# Patient Record
Sex: MALE | HISPANIC OR LATINO | Employment: FULL TIME | URBAN - METROPOLITAN AREA
[De-identification: names, ages, dates, MRNs, and addresses within clinical notes are randomized per-mention and may not be internally consistent; named-entity substitution may affect disease eponyms.]

---

## 2020-10-16 ENCOUNTER — OFFICE VISIT (OUTPATIENT)
Dept: MEDICAL GROUP | Facility: MEDICAL CENTER | Age: 55
End: 2020-10-16
Payer: COMMERCIAL

## 2020-10-16 VITALS
OXYGEN SATURATION: 95 % | WEIGHT: 163.14 LBS | BODY MASS INDEX: 27.18 KG/M2 | HEIGHT: 65 IN | SYSTOLIC BLOOD PRESSURE: 127 MMHG | RESPIRATION RATE: 16 BRPM | TEMPERATURE: 97.2 F | HEART RATE: 66 BPM | DIASTOLIC BLOOD PRESSURE: 70 MMHG

## 2020-10-16 DIAGNOSIS — H93.13 TINNITUS OF BOTH EARS: ICD-10-CM

## 2020-10-16 DIAGNOSIS — R42 VERTIGO: ICD-10-CM

## 2020-10-16 DIAGNOSIS — Z12.12 SCREENING FOR COLORECTAL CANCER: ICD-10-CM

## 2020-10-16 DIAGNOSIS — E78.6 LOW HDL (UNDER 40): ICD-10-CM

## 2020-10-16 DIAGNOSIS — Z12.11 SCREENING FOR COLORECTAL CANCER: ICD-10-CM

## 2020-10-16 DIAGNOSIS — Z12.5 SCREENING FOR PROSTATE CANCER: ICD-10-CM

## 2020-10-16 DIAGNOSIS — R73.03 PREDIABETES: ICD-10-CM

## 2020-10-16 DIAGNOSIS — Z11.59 NEED FOR HEPATITIS C SCREENING TEST: ICD-10-CM

## 2020-10-16 PROCEDURE — 99204 OFFICE O/P NEW MOD 45 MIN: CPT | Performed by: FAMILY MEDICINE

## 2020-10-16 RX ORDER — MECLIZINE HYDROCHLORIDE 25 MG/1
25 TABLET ORAL EVERY 8 HOURS PRN
Qty: 60 TAB | Refills: 0 | Status: SHIPPED | OUTPATIENT
Start: 2020-10-16 | End: 2022-06-27

## 2020-10-16 ASSESSMENT — PATIENT HEALTH QUESTIONNAIRE - PHQ9: CLINICAL INTERPRETATION OF PHQ2 SCORE: 0

## 2020-10-16 ASSESSMENT — ENCOUNTER SYMPTOMS
EYE DISCHARGE: 0
MYALGIAS: 0
EYE REDNESS: 0
PALPITATIONS: 0
SHORTNESS OF BREATH: 0
WEIGHT LOSS: 0
CHILLS: 0
HEMOPTYSIS: 0
ABDOMINAL PAIN: 0
SENSORY CHANGE: 0
NAUSEA: 0
NERVOUS/ANXIOUS: 0
COUGH: 0
HEADACHES: 0
SINUS PAIN: 0
WHEEZING: 0
EYE PAIN: 0
FEVER: 0
VOMITING: 0
DIARRHEA: 0
FOCAL WEAKNESS: 0
CONSTIPATION: 0
SPUTUM PRODUCTION: 0
DEPRESSION: 0

## 2020-10-16 ASSESSMENT — LIFESTYLE VARIABLES: SUBSTANCE_ABUSE: 0

## 2020-10-16 NOTE — PROGRESS NOTES
FAMILY MEDICINE VISIT                                                               Chief complaint::Diagnoses of Tinnitus of both ears, Vertigo, Low HDL (under 40), Screening for prostate cancer, Need for hepatitis C screening test, Screening for colorectal cancer, and Prediabetes were pertinent to this visit.    History of present illness: Alek Elliott is a 54 y.o. male who presented to establish care, vertigo, tinnitus of ear, labs.    Patient reports that he has a history of ringing sensation in both ears with vertigo symptoms.  He reports that he previously followed with ENT and reports that everything came back normal..  He reports that occasionally he gets tingling sensation in ER with dizziness with change in position of head.  He reports that these episodes are intermittent in nature.  Reports that sometimes they are associated with headache at back of head.  He does wear glasses, reports that he has not been checked for vision recently.  He reports that he also does not drink much water.  He denies any other neurological symptoms.  He has not tried any medications before.    He reports that he has history of prediabetes in the past.  He is currently not on any medication for prediabetes.  He has history of low HDL in the past.  He reports that he wants to get checked for prostate also.  He reports that he feels sometimes tired.    Review of systems:     Review of Systems   Constitutional: Positive for malaise/fatigue. Negative for chills, fever and weight loss.   HENT: Negative for ear discharge, ear pain, hearing loss and sinus pain.    Eyes: Negative for pain, discharge and redness.   Respiratory: Negative for cough, hemoptysis, sputum production, shortness of breath and wheezing.    Cardiovascular: Negative for chest pain, palpitations and leg swelling.   Gastrointestinal: Negative for abdominal pain, constipation, diarrhea, nausea and vomiting.   Genitourinary: Negative for dysuria, frequency and  "urgency.   Musculoskeletal: Negative for joint pain and myalgias.   Skin: Negative for itching and rash.   Neurological: Negative for sensory change, focal weakness and headaches.        Episodes of vertigo with ringing sensation in ear sometimes associated with headaches.   Endo/Heme/Allergies: Negative for environmental allergies.   Psychiatric/Behavioral: Negative for depression, substance abuse and suicidal ideas. The patient is not nervous/anxious.         Past Medical, Surgical and Family History:    Past Medical History:   Diagnosis Date   • Abnormal glucose tolerance test    • History of TIA (transient ischemic attack) 2013   • Low HDL (under 40)      History reviewed. No pertinent surgical history.  Family History   Problem Relation Age of Onset   • Cancer Father         lung?   • No Known Problems Mother         Social History:    Social History     Tobacco Use   • Smoking status: Former Smoker     Packs/day: 0.50     Years: 28.00     Pack years: 14.00     Types: Cigarettes     Quit date: 2014     Years since quittin.4   • Smokeless tobacco: Never Used   Substance Use Topics   • Alcohol use: No   • Drug use: No        Medications and Allergies:     Current Outpatient Medications   Medication Sig Dispense Refill   • meclizine (ANTIVERT) 25 MG Tab Take 1 Tab by mouth every 8 hours as needed for Dizziness. 60 Tab 0   • aspirin (ASA) 81 MG CHEW Take 1 Tab by mouth every day. 100 Each 11     No current facility-administered medications for this visit.         No Known Allergies    Vitals:    /70 (BP Location: Left arm, Patient Position: Sitting, BP Cuff Size: Adult)   Pulse 66   Temp 36.2 °C (97.2 °F)   Resp 16   Ht 1.651 m (5' 5\")   Wt 74 kg (163 lb 2.3 oz)   SpO2 95%  Body mass index is 27.15 kg/m².    Physical Exam:     Physical Exam   Constitutional: He is well-developed, well-nourished, and in no distress. No distress.   HENT:   Head: Normocephalic and atraumatic.   Right Ear: " External ear normal.   Left Ear: External ear normal.   Eyes: Conjunctivae and EOM are normal. Right eye exhibits no discharge. Left eye exhibits no discharge.   Neck: Neck supple. No thyromegaly present.   Cardiovascular: Normal rate, regular rhythm, normal heart sounds and intact distal pulses.   No murmur heard.  Pulmonary/Chest: Effort normal and breath sounds normal. No respiratory distress. He has no wheezes. He has no rales.   Abdominal: Soft. Bowel sounds are normal. He exhibits no distension. There is no abdominal tenderness. There is no guarding.   Musculoskeletal:         General: No deformity or edema.   Neurological: He is alert. Gait normal.   Skin: Skin is warm. No rash noted. He is not diaphoretic.   Psychiatric: Mood, affect and judgment normal.        Assessment/Plan:    Diagnoses and all orders for this visit:    Tinnitus of both ears  Vertigo  · Previous evaluation by ENT as per patient negative.  Check below labs.  · Prescribed meclizine as needed for vertigo.  · If symptoms not better, will refer to ENT.  · Advised patient to drink at least 64 ounces of water in a day.  · Advised to get his vision checked.    -     CBC WITHOUT DIFFERENTIAL; Future  -     Comp Metabolic Panel; Future  -     Lipid Profile; Future  -     TSH WITH REFLEX TO FT4; Future  -     VITAMIN D,25 HYDROXY; Future  -     VITAMIN B12; Future  -     HEMOGLOBIN A1C; Future  -     meclizine (ANTIVERT) 25 MG Tab; Take 1 Tab by mouth every 8 hours as needed for Dizziness.    Low HDL (under 40)  · Recheck lipid panel.  · Advised healthy diet and aerobic exercise.    -     Comp Metabolic Panel; Future  -     Lipid Profile; Future    Screening for prostate cancer  · Check PSA for prostate cancer.    -     PROSTATE SPECIFIC AG SCREENING; Future    · Need for hepatitis C screening test  · Check hepatitis C virus antibody for hepatitis C screening.    -     HEP C VIRUS ANTIBODY; Future    Screening for colorectal cancer  · Referral to  GI for colonoscopy.    -     REFERRAL TO GI FOR COLONOSCOPY    Prediabetes  · Advised healthy diet and aerobic exercise.  · Check A1c.    -     HEMOGLOBIN A1C; Future    Patient will check with his insurance regarding tetanus and shingles vaccine.  [facility]    Please note that this dictation was created using voice recognition software. I have made every reasonable attempt to correct obvious errors, but I expect that there are errors of grammar and possibly content that I did not discover before finalizing the note.    Follow up in 2 months for follow-up.

## 2020-10-19 ENCOUNTER — HOSPITAL ENCOUNTER (OUTPATIENT)
Dept: LAB | Facility: MEDICAL CENTER | Age: 55
End: 2020-10-19
Attending: FAMILY MEDICINE
Payer: COMMERCIAL

## 2020-10-19 DIAGNOSIS — Z12.5 SCREENING FOR PROSTATE CANCER: ICD-10-CM

## 2020-10-19 DIAGNOSIS — Z11.59 NEED FOR HEPATITIS C SCREENING TEST: ICD-10-CM

## 2020-10-19 DIAGNOSIS — R42 VERTIGO: ICD-10-CM

## 2020-10-19 DIAGNOSIS — E78.6 LOW HDL (UNDER 40): ICD-10-CM

## 2020-10-19 DIAGNOSIS — R73.03 PREDIABETES: ICD-10-CM

## 2020-10-19 LAB
25(OH)D3 SERPL-MCNC: 28 NG/ML (ref 30–100)
ALBUMIN SERPL BCP-MCNC: 4.3 G/DL (ref 3.2–4.9)
ALBUMIN/GLOB SERPL: 1.3 G/DL
ALP SERPL-CCNC: 84 U/L (ref 30–99)
ALT SERPL-CCNC: 21 U/L (ref 2–50)
ANION GAP SERPL CALC-SCNC: 10 MMOL/L (ref 7–16)
AST SERPL-CCNC: 20 U/L (ref 12–45)
BILIRUB SERPL-MCNC: 0.3 MG/DL (ref 0.1–1.5)
BUN SERPL-MCNC: 18 MG/DL (ref 8–22)
CALCIUM SERPL-MCNC: 9.3 MG/DL (ref 8.5–10.5)
CHLORIDE SERPL-SCNC: 103 MMOL/L (ref 96–112)
CHOLEST SERPL-MCNC: 210 MG/DL (ref 100–199)
CO2 SERPL-SCNC: 26 MMOL/L (ref 20–33)
CREAT SERPL-MCNC: 0.93 MG/DL (ref 0.5–1.4)
ERYTHROCYTE [DISTWIDTH] IN BLOOD BY AUTOMATED COUNT: 46.7 FL (ref 35.9–50)
EST. AVERAGE GLUCOSE BLD GHB EST-MCNC: 114 MG/DL
FASTING STATUS PATIENT QL REPORTED: NORMAL
GLOBULIN SER CALC-MCNC: 3.2 G/DL (ref 1.9–3.5)
GLUCOSE SERPL-MCNC: 113 MG/DL (ref 65–99)
HBA1C MFR BLD: 5.6 % (ref 0–5.6)
HCT VFR BLD AUTO: 49.9 % (ref 42–52)
HCV AB SER QL: NORMAL
HDLC SERPL-MCNC: 33 MG/DL
HGB BLD-MCNC: 16 G/DL (ref 14–18)
LDLC SERPL CALC-MCNC: 135 MG/DL
MCH RBC QN AUTO: 31.4 PG (ref 27–33)
MCHC RBC AUTO-ENTMCNC: 32.1 G/DL (ref 33.7–35.3)
MCV RBC AUTO: 97.8 FL (ref 81.4–97.8)
PLATELET # BLD AUTO: 257 K/UL (ref 164–446)
PMV BLD AUTO: 10.8 FL (ref 9–12.9)
POTASSIUM SERPL-SCNC: 4.3 MMOL/L (ref 3.6–5.5)
PROT SERPL-MCNC: 7.5 G/DL (ref 6–8.2)
PSA SERPL-MCNC: 2.26 NG/ML (ref 0–4)
RBC # BLD AUTO: 5.1 M/UL (ref 4.7–6.1)
SODIUM SERPL-SCNC: 139 MMOL/L (ref 135–145)
TRIGL SERPL-MCNC: 210 MG/DL (ref 0–149)
TSH SERPL DL<=0.005 MIU/L-ACNC: 1.77 UIU/ML (ref 0.38–5.33)
VIT B12 SERPL-MCNC: 819 PG/ML (ref 211–911)
WBC # BLD AUTO: 6.9 K/UL (ref 4.8–10.8)

## 2020-10-19 PROCEDURE — 85027 COMPLETE CBC AUTOMATED: CPT

## 2020-10-19 PROCEDURE — 86803 HEPATITIS C AB TEST: CPT

## 2020-10-19 PROCEDURE — 80053 COMPREHEN METABOLIC PANEL: CPT

## 2020-10-19 PROCEDURE — 82607 VITAMIN B-12: CPT

## 2020-10-19 PROCEDURE — 36415 COLL VENOUS BLD VENIPUNCTURE: CPT

## 2020-10-19 PROCEDURE — 82306 VITAMIN D 25 HYDROXY: CPT

## 2020-10-19 PROCEDURE — 84443 ASSAY THYROID STIM HORMONE: CPT

## 2020-10-19 PROCEDURE — 83036 HEMOGLOBIN GLYCOSYLATED A1C: CPT

## 2020-10-19 PROCEDURE — 80061 LIPID PANEL: CPT

## 2020-10-19 PROCEDURE — 84153 ASSAY OF PSA TOTAL: CPT

## 2020-10-20 DIAGNOSIS — E55.9 VITAMIN D DEFICIENCY: ICD-10-CM

## 2020-10-20 RX ORDER — ERGOCALCIFEROL 1.25 MG/1
50000 CAPSULE ORAL
Qty: 12 CAP | Refills: 1 | Status: SHIPPED | OUTPATIENT
Start: 2020-10-20 | End: 2020-12-17 | Stop reason: SDUPTHER

## 2020-10-20 NOTE — RESULT ENCOUNTER NOTE
Patient's my chart pending.  Please call patient to let him know that I reviewed his results.  His A1c, blood counts, prostate test, electrolytes, kidney function test, liver function test, thyroid function test, vitamin B12 came back normal.His vitamin D is low, I prescribed him vitamin D 50,000 international units once a week.His cholesterol levels are elevated.  I will discuss about starting medication at next visit on 12/17/2020.  Please advise him to eat healthy diet and aerobic exercise 150 minutes in a week.Thank you.

## 2020-12-17 ENCOUNTER — OFFICE VISIT (OUTPATIENT)
Dept: MEDICAL GROUP | Facility: MEDICAL CENTER | Age: 55
End: 2020-12-17
Payer: COMMERCIAL

## 2020-12-17 VITALS
DIASTOLIC BLOOD PRESSURE: 71 MMHG | WEIGHT: 167.55 LBS | TEMPERATURE: 97.6 F | RESPIRATION RATE: 18 BRPM | OXYGEN SATURATION: 97 % | HEIGHT: 65 IN | BODY MASS INDEX: 27.92 KG/M2 | SYSTOLIC BLOOD PRESSURE: 135 MMHG | HEART RATE: 67 BPM

## 2020-12-17 DIAGNOSIS — E55.9 VITAMIN D DEFICIENCY: ICD-10-CM

## 2020-12-17 DIAGNOSIS — E78.2 MIXED HYPERLIPIDEMIA: ICD-10-CM

## 2020-12-17 DIAGNOSIS — R73.03 PREDIABETES: ICD-10-CM

## 2020-12-17 PROCEDURE — 99214 OFFICE O/P EST MOD 30 MIN: CPT | Performed by: FAMILY MEDICINE

## 2020-12-17 RX ORDER — ERGOCALCIFEROL 1.25 MG/1
50000 CAPSULE ORAL
Qty: 12 CAP | Refills: 1 | Status: SHIPPED | OUTPATIENT
Start: 2020-12-17 | End: 2021-12-13 | Stop reason: SDUPTHER

## 2020-12-17 RX ORDER — ATORVASTATIN CALCIUM 20 MG/1
20 TABLET, FILM COATED ORAL DAILY
Qty: 90 TAB | Refills: 1 | Status: SHIPPED | OUTPATIENT
Start: 2020-12-17 | End: 2021-12-27 | Stop reason: SDUPTHER

## 2020-12-17 ASSESSMENT — ENCOUNTER SYMPTOMS
NAUSEA: 0
DIARRHEA: 0
PALPITATIONS: 0
FEVER: 0
MYALGIAS: 0
DIZZINESS: 0
HEADACHES: 0
DEPRESSION: 0
SENSORY CHANGE: 0
WHEEZING: 0
CHILLS: 0
SHORTNESS OF BREATH: 0
BLOOD IN STOOL: 0
ABDOMINAL PAIN: 0
NERVOUS/ANXIOUS: 0
VOMITING: 0
FOCAL WEAKNESS: 0
HEMOPTYSIS: 0
COUGH: 0
CONSTIPATION: 0

## 2020-12-17 ASSESSMENT — FIBROSIS 4 INDEX: FIB4 SCORE: 0.93

## 2020-12-18 NOTE — ASSESSMENT & PLAN NOTE
New problem for this patient.  Recent blood work showed vitamin D 28.  I prescribed him vitamin D 50,000 international units to [facility] pharmacy but patient reports that he was looking at [facility] pharmacy.

## 2020-12-18 NOTE — ASSESSMENT & PLAN NOTE
This is a chronic problem for this patient.  His recent A1c came back at 5.6 which improved from 5.7.

## 2020-12-18 NOTE — PROGRESS NOTES
FAMILY MEDICINE VISIT                                                               Chief complaint::Diagnoses of Vitamin D deficiency, Prediabetes, and Mixed hyperlipidemia were pertinent to this visit.    History of present illness: Alek Elliott is a 55 y.o. male who presented for follow-up for labs.    Mixed hyperlipidemia  This is a chronic plan for this patient.  Recent lipid panel showed elevated total cholesterol, triglyceride and LDL level.  He does have low HDL level.  He is currently not on any statins.      Lab Results   Component Value Date/Time    CHOLSTRLTOT 210 (H) 10/19/2020 0719    TRIGLYCERIDE 210 (H) 10/19/2020 0719    HDL 33 (A) 10/19/2020 0719     (H) 10/19/2020 0719       Prediabetes  This is a chronic problem for this patient.  His recent A1c came back at 5.6 which improved from 5.7.    Vitamin D deficiency  New problem for this patient.  Recent blood work showed vitamin D 28.  I prescribed him vitamin D 50,000 international units to [facility] pharmacy but patient reports that he was looking at [facility] pharmacy.            Review of systems:     Review of Systems   Constitutional: Negative for chills, fever and malaise/fatigue.   Respiratory: Negative for cough, hemoptysis, shortness of breath and wheezing.    Cardiovascular: Negative for chest pain, palpitations and leg swelling.   Gastrointestinal: Negative for abdominal pain, blood in stool, constipation, diarrhea, nausea and vomiting.   Musculoskeletal: Negative for myalgias.   Neurological: Negative for dizziness, sensory change, focal weakness and headaches.   Psychiatric/Behavioral: Negative for depression and suicidal ideas. The patient is not nervous/anxious.         Past Medical, Surgical and Family History:    Past Medical History:   Diagnosis Date   • Abnormal glucose tolerance test    • History of TIA (transient ischemic attack) 2/2013   • Low HDL (under 40)      No past surgical history on file.  Family History  "  Problem Relation Age of Onset   • Cancer Father         lung?   • No Known Problems Mother         Social History:    Social History     Tobacco Use   • Smoking status: Former Smoker     Packs/day: 0.50     Years: 28.00     Pack years: 14.00     Types: Cigarettes     Quit date: 2014     Years since quittin.6   • Smokeless tobacco: Never Used   Substance Use Topics   • Alcohol use: No   • Drug use: No        Medications and Allergies:     Current Outpatient Medications   Medication Sig Dispense Refill   • vitamin D, Ergocalciferol, (DRISDOL) 1.25 MG (95602 UT) Cap capsule Take 1 Cap by mouth every 7 days. 12 Cap 1   • atorvastatin (LIPITOR) 20 MG Tab Take 1 Tab by mouth every day. 90 Tab 1   • meclizine (ANTIVERT) 25 MG Tab Take 1 Tab by mouth every 8 hours as needed for Dizziness. 60 Tab 0   • aspirin (ASA) 81 MG CHEW Take 1 Tab by mouth every day. 100 Each 11     No current facility-administered medications for this visit.         No Known Allergies    Vitals:    /71 (BP Location: Left arm, Patient Position: Sitting, BP Cuff Size: Adult)   Pulse 67   Temp 36.4 °C (97.6 °F)   Resp 18   Ht 1.651 m (5' 5\")   Wt 76 kg (167 lb 8.8 oz)   SpO2 97%  Body mass index is 27.88 kg/m².    Physical Exam:     Physical Exam   Constitutional: He is well-developed, well-nourished, and in no distress. No distress.   HENT:   Head: Normocephalic and atraumatic.   Eyes: Conjunctivae are normal.   Neck: Neck supple.   Cardiovascular: Normal rate, regular rhythm and normal heart sounds.   Pulmonary/Chest: Effort normal and breath sounds normal. No respiratory distress. He has no wheezes. He has no rales.   Musculoskeletal:         General: No deformity or edema.   Neurological: He is alert. Gait normal.   Skin: No rash noted.   Psychiatric: Mood, affect and judgment normal.        Labs:    Reviewed labs with patient.    Assessment/Plan:    Diagnoses and all orders for this visit:    Vitamin D deficiency  · Prescribed " vitamin D 50,000 international units every 7 days.  · Recheck labs in 4 months.    -     vitamin D, Ergocalciferol, (DRISDOL) 1.25 MG (78941 UT) Cap capsule; Take 1 Cap by mouth every 7 days.  -     VITAMIN D,25 HYDROXY; Future    Prediabetes  · Improving, continue healthy diet and aerobic exercise.    -     Comp Metabolic Panel; Future    Mixed hyperlipidemia  · Uncontrolled.  · The 10-year ASCVD risk score (Bairon KOBE Jr., et al., 2013) is: 9.3%  · Start on atorvastatin 20 mg once daily.  · Discussed with patient about side effects of this medication.  · Recheck lipid panel in 4 months.      -     atorvastatin (LIPITOR) 20 MG Tab; Take 1 Tab by mouth every day.  -     Lipid Profile; Future         Please note that this dictation was created using voice recognition software. I have made every reasonable attempt to correct obvious errors, but I expect that there are errors of grammar and possibly content that I did not discover before finalizing the note.    Follow up in 4 months for follow-up for labs.

## 2020-12-18 NOTE — ASSESSMENT & PLAN NOTE
This is a chronic plan for this patient.  Recent lipid panel showed elevated total cholesterol, triglyceride and LDL level.  He does have low HDL level.  He is currently not on any statins.      Lab Results   Component Value Date/Time    CHOLSTRLTOT 210 (H) 10/19/2020 0719    TRIGLYCERIDE 210 (H) 10/19/2020 0719    HDL 33 (A) 10/19/2020 0719     (H) 10/19/2020 0719

## 2021-03-15 DIAGNOSIS — Z23 NEED FOR VACCINATION: ICD-10-CM

## 2021-04-16 ENCOUNTER — APPOINTMENT (OUTPATIENT)
Dept: MEDICAL GROUP | Facility: MEDICAL CENTER | Age: 56
End: 2021-04-16
Payer: COMMERCIAL

## 2021-11-24 ENCOUNTER — TELEPHONE (OUTPATIENT)
Dept: MEDICAL GROUP | Facility: MEDICAL CENTER | Age: 56
End: 2021-11-24

## 2021-11-24 DIAGNOSIS — Z12.11 COLON CANCER SCREENING: ICD-10-CM

## 2021-12-10 ENCOUNTER — HOSPITAL ENCOUNTER (OUTPATIENT)
Dept: LAB | Facility: MEDICAL CENTER | Age: 56
End: 2021-12-10
Attending: FAMILY MEDICINE
Payer: COMMERCIAL

## 2021-12-10 DIAGNOSIS — E55.9 VITAMIN D DEFICIENCY: ICD-10-CM

## 2021-12-10 DIAGNOSIS — E78.2 MIXED HYPERLIPIDEMIA: ICD-10-CM

## 2021-12-10 DIAGNOSIS — R73.03 PREDIABETES: ICD-10-CM

## 2021-12-10 LAB
ALBUMIN SERPL BCP-MCNC: 4 G/DL (ref 3.2–4.9)
ALBUMIN/GLOB SERPL: 1.5 G/DL
ALP SERPL-CCNC: 84 U/L (ref 30–99)
ALT SERPL-CCNC: 19 U/L (ref 2–50)
ANION GAP SERPL CALC-SCNC: 10 MMOL/L (ref 7–16)
AST SERPL-CCNC: 26 U/L (ref 12–45)
BILIRUB SERPL-MCNC: 0.3 MG/DL (ref 0.1–1.5)
BUN SERPL-MCNC: 15 MG/DL (ref 8–22)
CALCIUM SERPL-MCNC: 8.7 MG/DL (ref 8.5–10.5)
CHLORIDE SERPL-SCNC: 104 MMOL/L (ref 96–112)
CHOLEST SERPL-MCNC: 183 MG/DL (ref 100–199)
CO2 SERPL-SCNC: 25 MMOL/L (ref 20–33)
CREAT SERPL-MCNC: 0.91 MG/DL (ref 0.5–1.4)
FASTING STATUS PATIENT QL REPORTED: NORMAL
GLOBULIN SER CALC-MCNC: 2.6 G/DL (ref 1.9–3.5)
GLUCOSE SERPL-MCNC: 110 MG/DL (ref 65–99)
HDLC SERPL-MCNC: 31 MG/DL
LDLC SERPL CALC-MCNC: 114 MG/DL
POTASSIUM SERPL-SCNC: 4.3 MMOL/L (ref 3.6–5.5)
PROT SERPL-MCNC: 6.6 G/DL (ref 6–8.2)
SODIUM SERPL-SCNC: 139 MMOL/L (ref 135–145)
TRIGL SERPL-MCNC: 188 MG/DL (ref 0–149)

## 2021-12-10 PROCEDURE — 80053 COMPREHEN METABOLIC PANEL: CPT

## 2021-12-10 PROCEDURE — 80061 LIPID PANEL: CPT

## 2021-12-10 PROCEDURE — 82306 VITAMIN D 25 HYDROXY: CPT

## 2021-12-10 PROCEDURE — 36415 COLL VENOUS BLD VENIPUNCTURE: CPT

## 2021-12-13 ENCOUNTER — TELEPHONE (OUTPATIENT)
Dept: MEDICAL GROUP | Facility: MEDICAL CENTER | Age: 56
End: 2021-12-13

## 2021-12-13 DIAGNOSIS — E55.9 VITAMIN D DEFICIENCY: ICD-10-CM

## 2021-12-13 LAB — 25(OH)D3 SERPL-MCNC: 19 NG/ML (ref 30–80)

## 2021-12-13 RX ORDER — ERGOCALCIFEROL 1.25 MG/1
50000 CAPSULE ORAL
Qty: 12 CAPSULE | Refills: 2 | Status: SHIPPED | OUTPATIENT
Start: 2021-12-13 | End: 2022-06-27 | Stop reason: SDUPTHER

## 2021-12-13 NOTE — TELEPHONE ENCOUNTER
----- Message from Kuldeep Cadet M.D. sent at 12/13/2021  8:17 AM PST -----  MyChart is pending and he does not have any follow-up appointment.  Please call patient regarding his labs.  His cholesterol panel numbers are improving, continue same medication regimen atorvastatin 20 mg.  His blood sugar level came back elevated at 110.  His last appointment was on 12/17/2020, please recommend patient to schedule appointment for follow-up regarding these results. Thank you.

## 2021-12-14 NOTE — RESULT ENCOUNTER NOTE
Patient's vitamin D level came back very low, his MyChart is pending.  Please call patient regarding vitamin D results.  I prescribed vitamin D 50,000/units weekly.

## 2021-12-15 ENCOUNTER — TELEPHONE (OUTPATIENT)
Dept: MEDICAL GROUP | Facility: MEDICAL CENTER | Age: 56
End: 2021-12-15

## 2021-12-15 NOTE — TELEPHONE ENCOUNTER
Called and spoke to pt regarding results per PCP request. Pt verbalized understanding, no further questions at this time. Pt was scheduled for appt with Dr Cadet on 12/27/2021 at 2pm

## 2021-12-27 ENCOUNTER — OFFICE VISIT (OUTPATIENT)
Dept: MEDICAL GROUP | Facility: MEDICAL CENTER | Age: 56
End: 2021-12-27
Payer: COMMERCIAL

## 2021-12-27 VITALS
RESPIRATION RATE: 14 BRPM | SYSTOLIC BLOOD PRESSURE: 110 MMHG | DIASTOLIC BLOOD PRESSURE: 74 MMHG | HEART RATE: 79 BPM | HEIGHT: 65 IN | WEIGHT: 182.98 LBS | TEMPERATURE: 98.1 F | OXYGEN SATURATION: 96 % | BODY MASS INDEX: 30.49 KG/M2

## 2021-12-27 DIAGNOSIS — R73.03 PREDIABETES: ICD-10-CM

## 2021-12-27 DIAGNOSIS — Z23 NEED FOR VACCINATION: ICD-10-CM

## 2021-12-27 DIAGNOSIS — E78.2 MIXED HYPERLIPIDEMIA: ICD-10-CM

## 2021-12-27 DIAGNOSIS — E55.9 VITAMIN D DEFICIENCY: ICD-10-CM

## 2021-12-27 DIAGNOSIS — E66.09 CLASS 1 OBESITY DUE TO EXCESS CALORIES WITHOUT SERIOUS COMORBIDITY WITH BODY MASS INDEX (BMI) OF 30.0 TO 30.9 IN ADULT: ICD-10-CM

## 2021-12-27 PROCEDURE — 99214 OFFICE O/P EST MOD 30 MIN: CPT | Mod: 25 | Performed by: FAMILY MEDICINE

## 2021-12-27 PROCEDURE — 90471 IMMUNIZATION ADMIN: CPT | Performed by: FAMILY MEDICINE

## 2021-12-27 PROCEDURE — 90715 TDAP VACCINE 7 YRS/> IM: CPT | Performed by: FAMILY MEDICINE

## 2021-12-27 RX ORDER — ATORVASTATIN CALCIUM 20 MG/1
20 TABLET, FILM COATED ORAL DAILY
Qty: 90 TABLET | Refills: 1 | Status: SHIPPED | OUTPATIENT
Start: 2021-12-27 | End: 2022-06-27 | Stop reason: SDUPTHER

## 2021-12-27 ASSESSMENT — ENCOUNTER SYMPTOMS
COUGH: 0
WHEEZING: 0
FEVER: 0
SHORTNESS OF BREATH: 0
PALPITATIONS: 0
CHILLS: 0

## 2021-12-27 ASSESSMENT — PATIENT HEALTH QUESTIONNAIRE - PHQ9
5. POOR APPETITE OR OVEREATING: 0 - NOT AT ALL
SUM OF ALL RESPONSES TO PHQ QUESTIONS 1-9: 4
CLINICAL INTERPRETATION OF PHQ2 SCORE: 2

## 2021-12-27 ASSESSMENT — FIBROSIS 4 INDEX: FIB4 SCORE: 1.3

## 2021-12-27 NOTE — PROGRESS NOTES
FAMILY MEDICINE VISIT                                                               Chief complaint::Diagnoses of Mixed hyperlipidemia, Prediabetes, Vitamin D deficiency, Need for vaccination, and Class 1 obesity due to excess calories without serious comorbidity with body mass index (BMI) of 30.0 to 30.9 in adult were pertinent to this visit.    History of present illness: Alek Elliott is a 56 y.o. male who presented for lab follow-up.    Problem   Vitamin D Deficiency    Recent vitamin D level came back at 19, previously prescribed and vitamin high dose, reports that he is currently not taking vitamin D 50,000/units daily as he ran out of refills.  He has been taking over-the-counter supplementation.  He reports that he has been feeling fatigued also.     Prediabetes    Recent blood sugar came back elevated at 110, previous A1c came back at 5.6 which improved from previous numbers.  He reports that he is trying to eat healthy diet.     Mixed Hyperlipidemia    Recent cholesterol numbers showed improvement in total cholesterol, triglyceride and LDL level.  Levels are still elevated.  He is not taking statins which I prescribed at last visit.    Lab Results   Component Value Date/Time    CHOLSTRLTOT 183 12/10/2021 0639    TRIGLYCERIDE 188 (H) 12/10/2021 0639    HDL 31 (A) 12/10/2021 0639     (H) 12/10/2021 0639          Review of systems:     Review of Systems   Constitutional: Positive for malaise/fatigue. Negative for chills and fever.   Respiratory: Negative for cough, shortness of breath and wheezing.    Cardiovascular: Negative for chest pain, palpitations and leg swelling.        Medications and Allergies:     Current Outpatient Medications   Medication Sig Dispense Refill   • atorvastatin (LIPITOR) 20 MG Tab Take 1 Tablet by mouth every day. 90 Tablet 1   • vitamin D2, Ergocalciferol, (DRISDOL) 1.25 MG (51165 UT) Cap capsule Take 1 Capsule by mouth every 7 days. 12 Capsule 2   • meclizine  "(ANTIVERT) 25 MG Tab Take 1 Tab by mouth every 8 hours as needed for Dizziness. 60 Tab 0   • aspirin (ASA) 81 MG CHEW Take 1 Tab by mouth every day. 100 Each 11     No current facility-administered medications for this visit.          Vitals:    /74 (BP Location: Left arm, Patient Position: Sitting, BP Cuff Size: Adult)   Pulse 79   Temp 36.7 °C (98.1 °F) (Temporal)   Resp 14   Ht 1.651 m (5' 5\")   Wt 83 kg (182 lb 15.7 oz)   SpO2 96%  Body mass index is 30.45 kg/m².    Physical Exam:     Physical Exam  Constitutional:       General: He is not in acute distress.  HENT:      Head: Normocephalic and atraumatic.   Eyes:      Conjunctiva/sclera: Conjunctivae normal.   Cardiovascular:      Rate and Rhythm: Normal rate and regular rhythm.      Heart sounds: Normal heart sounds. No murmur heard.  No friction rub. No gallop.    Pulmonary:      Effort: Pulmonary effort is normal. No respiratory distress.      Breath sounds: Normal breath sounds. No wheezing or rales.   Musculoskeletal:         General: No deformity.      Cervical back: Neck supple.   Neurological:      Mental Status: He is alert.      Gait: Gait is intact.   Psychiatric:         Mood and Affect: Mood and affect normal.         Judgment: Judgment normal.          Labs:  I reviewed with patient recent labs resulted on 12/10/2021.    Assessment/Plan:    Problem List Items Addressed This Visit     Mixed hyperlipidemia     Chronic health problem, are improving but still elevated.  Recommended to take atorvastatin 20 mg daily.  Recheck labs in 6 months.  Eat healthy diet and do aerobic exercise 150 minutes in a week.         Relevant Medications    atorvastatin (LIPITOR) 20 MG Tab    Other Relevant Orders    Comp Metabolic Panel    Lipid Profile    Prediabetes     Chronic health problem, recheck A1c to assess control.  Continue to eat healthy diet and do aerobic exercise.         Relevant Orders    HEMOGLOBIN A1C    Vitamin D deficiency     Chronic " health problem, low vitamin D level, prescribed vitamin D 50,000/units daily, recheck labs in 6 months.         Relevant Orders    VITAMIN D,25 HYDROXY      Other Visit Diagnoses     Need for vaccination        Relevant Orders    Tdap =>6yo IM    Class 1 obesity due to excess calories without serious comorbidity with body mass index (BMI) of 30.0 to 30.9 in adult        Relevant Orders    Patient identified as having weight management issue.  Appropriate orders and counseling given.           Please note that this dictation was created using voice recognition software. I have made every reasonable attempt to correct obvious errors, but I expect that there are errors of grammar and possibly content that I did not discover before finalizing the note.    Follow up in 6 months for lab follow-up.

## 2021-12-27 NOTE — ASSESSMENT & PLAN NOTE
Chronic health problem, are improving but still elevated.  Recommended to take atorvastatin 20 mg daily.  Recheck labs in 6 months.  Eat healthy diet and do aerobic exercise 150 minutes in a week.

## 2021-12-27 NOTE — ASSESSMENT & PLAN NOTE
Chronic health problem, recheck A1c to assess control.  Continue to eat healthy diet and do aerobic exercise.

## 2021-12-27 NOTE — ASSESSMENT & PLAN NOTE
Chronic health problem, low vitamin D level, prescribed vitamin D 50,000/units daily, recheck labs in 6 months.

## 2022-02-21 ENCOUNTER — OCCUPATIONAL MEDICINE (OUTPATIENT)
Dept: URGENT CARE | Facility: PHYSICIAN GROUP | Age: 57
End: 2022-02-21
Payer: COMMERCIAL

## 2022-02-21 VITALS
HEART RATE: 70 BPM | DIASTOLIC BLOOD PRESSURE: 78 MMHG | HEIGHT: 64 IN | BODY MASS INDEX: 30.73 KG/M2 | RESPIRATION RATE: 14 BRPM | SYSTOLIC BLOOD PRESSURE: 116 MMHG | WEIGHT: 180 LBS | OXYGEN SATURATION: 98 % | TEMPERATURE: 97.7 F

## 2022-02-21 DIAGNOSIS — S80.02XA CONTUSION OF LEFT KNEE, INITIAL ENCOUNTER: ICD-10-CM

## 2022-02-21 DIAGNOSIS — S09.93XA INJURY OF TOOTH, INITIAL ENCOUNTER: ICD-10-CM

## 2022-02-21 PROCEDURE — 99213 OFFICE O/P EST LOW 20 MIN: CPT | Performed by: NURSE PRACTITIONER

## 2022-02-21 ASSESSMENT — ENCOUNTER SYMPTOMS
FEVER: 0
CHILLS: 0

## 2022-02-21 ASSESSMENT — FIBROSIS 4 INDEX: FIB4 SCORE: 1.3
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
## 2022-02-25 ENCOUNTER — OCCUPATIONAL MEDICINE (OUTPATIENT)
Dept: URGENT CARE | Facility: PHYSICIAN GROUP | Age: 57
End: 2022-02-25
Payer: COMMERCIAL

## 2022-02-25 VITALS
SYSTOLIC BLOOD PRESSURE: 112 MMHG | BODY MASS INDEX: 30.9 KG/M2 | RESPIRATION RATE: 16 BRPM | WEIGHT: 181 LBS | HEIGHT: 64 IN | TEMPERATURE: 98.7 F | HEART RATE: 88 BPM | OXYGEN SATURATION: 98 % | DIASTOLIC BLOOD PRESSURE: 74 MMHG

## 2022-02-25 DIAGNOSIS — S80.02XD CONTUSION OF LEFT KNEE, SUBSEQUENT ENCOUNTER: ICD-10-CM

## 2022-02-25 DIAGNOSIS — S09.93XD INJURY OF TOOTH, SUBSEQUENT ENCOUNTER: ICD-10-CM

## 2022-02-25 PROCEDURE — 99212 OFFICE O/P EST SF 10 MIN: CPT | Performed by: FAMILY MEDICINE

## 2022-02-25 ASSESSMENT — FIBROSIS 4 INDEX: FIB4 SCORE: 1.3

## 2022-02-25 NOTE — LETTER
[address] [place] [address]  Phone:  [phone] - Fax:  [phone]   [facility] Progress Report and Disability Certification  Date of Service: 2/25/2022   No Show:  No  Date / Time of Next Visit:   Will follow-up with Dentist for tooth issue. Discharged from here.   Claim Information   Patient Name: Alek Elliott  Claim Number:     Employer: [employer]  Date of Injury: 2/21/2022     Insurer / TPA: [employer]  ID / SSN:     Occupation: slot tech  Diagnosis: Diagnoses of Contusion of left knee, subsequent encounter and Injury of tooth, subsequent encounter were pertinent to this visit.    Medical Information   Related to Industrial Injury? Yes    Subjective Complaints:  DOI: 2/21/22. Left knee is improved - a little pain. Saw Dentist - put a wire to stabilize the tooth and will follow-up with Dentist for tooth injury.   Objective Findings: Normal gait. Normal range of motion. No muscular atrophy or weakness.   Pre-Existing Condition(s):     Assessment:   Condition Improved    Status: Discharged /  MMI  Permanent Disability:No    Plan:      Diagnostics:      Comments:  Will follow-up with Dentist for tooth issue.    Disability Information   Status: Released to Full Duty    From:     Through:   Restrictions are:     Physical Restrictions   Sitting:    Standing:    Stooping:    Bending:      Squatting:    Walking:    Climbing:    Pushing:      Pulling:    Other:    Reaching Above Shoulder (L):   Reaching Above Shoulder (R):       Reaching Below Shoulder (L):    Reaching Below Shoulder (R):      Not to exceed Weight Limits   Carrying(hrs):   Weight Limit(lb):   Lifting(hrs):   Weight  Limit(lb):     Comments:      Repetitive Actions   Hands: i.e. Fine Manipulations from Grasping:     Feet: i.e. Operating Foot Controls:     Driving / Operate Machinery:     Health Care Provider’s Original or Electronic  Signature  Ric Johnson M.D. Health Care Provider’s Original or Electronic Signature    Sammy Santos MD         Clinic Name / Location: [address] [address] Clinic Phone Number: Dept: [phone]   Appointment Time: 1:30 Pm Visit Start Time: 2:06 PM   Check-In Time:  1:28 Pm Visit Discharge Time:  2:32PM   Original-Treating Physician or Chiropractor    Page 2-Insurer/TPA    Page 3-Employer    Page 4-Employee

## 2022-02-25 NOTE — PROGRESS NOTES
Chief Complaint:    Chief Complaint   Patient presents with   • Knee Injury     LT knee pain        History of Present Illness:    DOI: 22. Left knee is improved - a little pain. Saw Dentist - put a wire to stabilize the tooth and will follow-up with Dentist for tooth injury.      Past Medical History:    Past Medical History:   Diagnosis Date   • Abnormal glucose tolerance test    • History of TIA (transient ischemic attack) 2013   • Low HDL (under 40)      Past Surgical History:    No past surgical history on file.    Social History:    Social History     Socioeconomic History   • Marital status:      Spouse name: Not on file   • Number of children: Not on file   • Years of education: Not on file   • Highest education level: Not on file   Occupational History   • Not on file   Tobacco Use   • Smoking status: Current Every Day Smoker     Packs/day: 0.50     Years: 28.00     Pack years: 14.00     Types: Cigarettes     Last attempt to quit: 2014     Years since quittin.8   • Smokeless tobacco: Never Used   Substance and Sexual Activity   • Alcohol use: No   • Drug use: No   • Sexual activity: Yes     Comment: Work as    Other Topics Concern   • Not on file   Social History Narrative   • Not on file     Social Determinants of Health     Financial Resource Strain: Not on file   Food Insecurity: Not on file   Transportation Needs: Not on file   Physical Activity: Not on file   Stress: Not on file   Social Connections: Not on file   Intimate Partner Violence: Not on file   Housing Stability: Not on file     Family History:    Family History   Problem Relation Age of Onset   • Cancer Father         lung?   • No Known Problems Mother      Medications:    Current Outpatient Medications on File Prior to Visit   Medication Sig Dispense Refill   • atorvastatin (LIPITOR) 20 MG Tab Take 1 Tablet by mouth every day. 90 Tablet 1   • vitamin D2, Ergocalciferol, (DRISDOL) 1.25 MG (79411 UT) Cap capsule  "Take 1 Capsule by mouth every 7 days. 12 Capsule 2   • meclizine (ANTIVERT) 25 MG Tab Take 1 Tab by mouth every 8 hours as needed for Dizziness. 60 Tab 0   • aspirin (ASA) 81 MG CHEW Take 1 Tab by mouth every day. 100 Each 11     No current facility-administered medications on file prior to visit.     Allergies:    No Known Allergies      Vitals:    Vitals:    22 1409   BP: 112/74   Pulse: 88   Resp: 16   Temp: 37.1 °C (98.7 °F)   TempSrc: Temporal   SpO2: 98%   Weight: 82.1 kg (181 lb)   Height: 1.626 m (5' 4\")       Physical Exam:    Constitutional: Vital signs reviewed. Appears well-developed and well-nourished. No acute distress.   Eyes: Sclera white, conjunctivae clear.   ENT: External ears normal. Hearing normal.  Neck: Neck supple.   Pulmonary/Chest: Respirations non-labored.   Musculoskeletal: Normal gait. Normal range of motion. No muscular atrophy or weakness.  Neurological: Alert and oriented to person, place, and time. Muscle tone normal. Coordination normal.   Skin: No rashes or lesions. Warm, dry, normal turgor.  Psychiatric: Normal mood and affect. Behavior is normal. Judgment and thought content normal.       Medical Decision Makin. Contusion of left knee, subsequent encounter    2. Injury of tooth, subsequent encounter      Discussed with him DDX, management options, and risks, benefits, and alternatives to treatment plan agreed upon.    DOI: 22. Left knee is improved - a little pain. Saw Dentist - put a wire to stabilize the tooth and will follow-up with Dentist for tooth injury.    Normal gait. Normal range of motion. No muscular atrophy or weakness.     Full duty.    Will follow-up with Dentist for tooth issue.    Discharged from here.  "

## 2022-06-27 ENCOUNTER — OFFICE VISIT (OUTPATIENT)
Dept: MEDICAL GROUP | Facility: MEDICAL CENTER | Age: 57
End: 2022-06-27
Payer: COMMERCIAL

## 2022-06-27 VITALS
DIASTOLIC BLOOD PRESSURE: 62 MMHG | TEMPERATURE: 97.5 F | BODY MASS INDEX: 29.73 KG/M2 | OXYGEN SATURATION: 97 % | HEART RATE: 69 BPM | RESPIRATION RATE: 16 BRPM | WEIGHT: 174.16 LBS | SYSTOLIC BLOOD PRESSURE: 122 MMHG | HEIGHT: 64 IN

## 2022-06-27 DIAGNOSIS — Z23 NEED FOR VACCINATION: ICD-10-CM

## 2022-06-27 DIAGNOSIS — Z12.5 SCREENING FOR PROSTATE CANCER: ICD-10-CM

## 2022-06-27 DIAGNOSIS — E78.2 MIXED HYPERLIPIDEMIA: ICD-10-CM

## 2022-06-27 DIAGNOSIS — H00.011 HORDEOLUM EXTERNUM OF RIGHT UPPER EYELID: ICD-10-CM

## 2022-06-27 DIAGNOSIS — E55.9 VITAMIN D DEFICIENCY: ICD-10-CM

## 2022-06-27 DIAGNOSIS — R73.03 PREDIABETES: ICD-10-CM

## 2022-06-27 PROCEDURE — 90677 PCV20 VACCINE IM: CPT | Performed by: FAMILY MEDICINE

## 2022-06-27 PROCEDURE — 99214 OFFICE O/P EST MOD 30 MIN: CPT | Mod: 25 | Performed by: FAMILY MEDICINE

## 2022-06-27 PROCEDURE — 90471 IMMUNIZATION ADMIN: CPT | Performed by: FAMILY MEDICINE

## 2022-06-27 RX ORDER — ATORVASTATIN CALCIUM 20 MG/1
20 TABLET, FILM COATED ORAL DAILY
Qty: 90 TABLET | Refills: 3 | Status: SHIPPED | OUTPATIENT
Start: 2022-06-27 | End: 2022-10-27

## 2022-06-27 RX ORDER — ERGOCALCIFEROL 1.25 MG/1
50000 CAPSULE ORAL
Qty: 12 CAPSULE | Refills: 2 | Status: SHIPPED | OUTPATIENT
Start: 2022-06-27 | End: 2022-10-27

## 2022-06-27 ASSESSMENT — ENCOUNTER SYMPTOMS
SHORTNESS OF BREATH: 0
PALPITATIONS: 0
COUGH: 0
WHEEZING: 0
CHILLS: 0
FEVER: 0
EYE PAIN: 1

## 2022-06-27 ASSESSMENT — FIBROSIS 4 INDEX: FIB4 SCORE: 1.3

## 2022-06-27 ASSESSMENT — PATIENT HEALTH QUESTIONNAIRE - PHQ9: CLINICAL INTERPRETATION OF PHQ2 SCORE: 0

## 2022-06-27 NOTE — PROGRESS NOTES
FAMILY MEDICINE VISIT                                                               Chief complaint::Diagnoses of Vitamin D deficiency, Prediabetes, Mixed hyperlipidemia, Need for vaccination, Screening for prostate cancer, and Hordeolum externum of right upper eyelid were pertinent to this visit.    History of present illness: Alek Elliott is a 56 y.o. male who presented for follow-up    Problem   Hordeolum Externum of Right Upper Eyelid    Reports that has lesion over his right upper eyelid since few days ago.  No redness in eye, no discharge.  Sometimes get discomfort in this eye     Vitamin D Deficiency    Previous vitamin D level came back at 19, previously prescribed and vitamin high dose, reports that he is currently not taking vitamin D 50,000/units daily as he ran out of refills.      Prediabetes    Recent blood sugar came back elevated at 110, previous A1c came back at 5.6 which improved from previous numbers.  He reports that he is trying to eat healthy diet.     Mixed Hyperlipidemia    Recent cholesterol numbers showed improvement in total cholesterol, triglyceride and LDL level.  Levels are still elevated.  He is not taking statins which I prescribed at last visit as he ran out of refills.    Lab Results   Component Value Date/Time    CHOLSTRLTOT 183 12/10/2021 0639    TRIGLYCERIDE 188 (H) 12/10/2021 0639    HDL 31 (A) 12/10/2021 0639     (H) 12/10/2021 0639            Review of systems:     Review of Systems   Constitutional: Negative for chills, fever and malaise/fatigue.   Eyes: Positive for pain.   Respiratory: Negative for cough, shortness of breath and wheezing.    Cardiovascular: Negative for chest pain, palpitations and leg swelling.        Medications and Allergies:     Current Outpatient Medications   Medication Sig Dispense Refill   • vitamin D2, Ergocalciferol, (DRISDOL) 1.25 MG (45359 UT) Cap capsule Take 1 Capsule by mouth every 7 days. 12 Capsule 2   • atorvastatin (LIPITOR)  "20 MG Tab Take 1 Tablet by mouth every day. 90 Tablet 3   • aspirin (ASA) 81 MG CHEW Take 1 Tab by mouth every day. 100 Each 11     No current facility-administered medications for this visit.          Vitals:    /62 (BP Location: Left arm, Patient Position: Sitting, BP Cuff Size: Adult)   Pulse 69   Temp 36.4 °C (97.5 °F)   Resp 16   Ht 1.626 m (5' 4\")   Wt 79 kg (174 lb 2.6 oz)   SpO2 97%  Body mass index is 29.9 kg/m².    Physical Exam:     Physical Exam  Constitutional:       General: He is not in acute distress.  HENT:      Head: Normocephalic and atraumatic.   Eyes:      General:         Right eye: Hordeolum present.      Conjunctiva/sclera: Conjunctivae normal.     Cardiovascular:      Rate and Rhythm: Normal rate and regular rhythm.      Heart sounds: Normal heart sounds. No murmur heard.    No friction rub. No gallop.   Pulmonary:      Effort: Pulmonary effort is normal. No respiratory distress.      Breath sounds: Normal breath sounds. No wheezing or rales.   Musculoskeletal:         General: No deformity.      Cervical back: Neck supple.   Neurological:      Mental Status: He is alert.      Gait: Gait is intact.   Psychiatric:         Mood and Affect: Mood and affect normal.         Judgment: Judgment normal.            Assessment/Plan:    Problem List Items Addressed This Visit     Hordeolum externum of right upper eyelid     New health problem, recommended to use warm compresses, if not getting better, recommended to follow-up with ophthalmology           Mixed hyperlipidemia     Chronic health problem, uncontrolled levels, recheck lipid panel to assess control.  Prescribed Lipitor 20 mg daily.           Relevant Medications    atorvastatin (LIPITOR) 20 MG Tab    Other Relevant Orders    Comp Metabolic Panel    Lipid Profile    Prediabetes     Chronic health problem, recheck A1c to assess control.  Eat healthy diet and exercise 150 minutes in a week           Relevant Orders    HEMOGLOBIN " A1C    Vitamin D deficiency     Chronic health problem, ordered vitamin D prescription.  Recheck vitamin D level to assess control           Relevant Medications    vitamin D2, Ergocalciferol, (DRISDOL) 1.25 MG (05321 UT) Cap capsule    Other Relevant Orders    VITAMIN D,25 HYDROXY      Other Visit Diagnoses     Need for vaccination        Relevant Orders    Pneumococcal Conjugate Vaccine 20-Valent (19 yrs+) (Completed)    Screening for prostate cancer        Relevant Orders    PROSTATE SPECIFIC AG SCREENING           Please note that this dictation was created using voice recognition software. I have made every reasonable attempt to correct obvious errors, but I expect that there are errors of grammar and possibly content that I did not discover before finalizing the note.    Follow up in 4 months for lab follow-up

## 2022-06-27 NOTE — ASSESSMENT & PLAN NOTE
Chronic health problem, uncontrolled levels, recheck lipid panel to assess control.  Prescribed Lipitor 20 mg daily.

## 2022-06-27 NOTE — ASSESSMENT & PLAN NOTE
New health problem, recommended to use warm compresses, if not getting better, recommended to follow-up with ophthalmology

## 2022-06-27 NOTE — ASSESSMENT & PLAN NOTE
Chronic health problem, ordered vitamin D prescription.  Recheck vitamin D level to assess control

## 2022-06-27 NOTE — ASSESSMENT & PLAN NOTE
Chronic health problem, recheck A1c to assess control.  Eat healthy diet and exercise 150 minutes in a week

## 2022-10-27 ENCOUNTER — OCCUPATIONAL MEDICINE (OUTPATIENT)
Dept: URGENT CARE | Facility: PHYSICIAN GROUP | Age: 57
End: 2022-10-27
Payer: COMMERCIAL

## 2022-10-27 ENCOUNTER — NON-PROVIDER VISIT (OUTPATIENT)
Dept: URGENT CARE | Facility: PHYSICIAN GROUP | Age: 57
End: 2022-10-27
Payer: COMMERCIAL

## 2022-10-27 ENCOUNTER — APPOINTMENT (OUTPATIENT)
Dept: MEDICAL GROUP | Facility: MEDICAL CENTER | Age: 57
End: 2022-10-27
Payer: COMMERCIAL

## 2022-10-27 VITALS
OXYGEN SATURATION: 100 % | SYSTOLIC BLOOD PRESSURE: 154 MMHG | HEIGHT: 66 IN | RESPIRATION RATE: 14 BRPM | HEART RATE: 92 BPM | DIASTOLIC BLOOD PRESSURE: 78 MMHG | TEMPERATURE: 98.6 F | BODY MASS INDEX: 28.11 KG/M2

## 2022-10-27 DIAGNOSIS — R03.0 ELEVATED BLOOD PRESSURE READING: ICD-10-CM
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
## 2022-10-27 DIAGNOSIS — S71.111A LACERATION OF RIGHT THIGH, INITIAL ENCOUNTER: ICD-10-CM

## 2022-10-27 DIAGNOSIS — Z02.1 PRE-EMPLOYMENT DRUG SCREENING: ICD-10-CM

## 2022-10-27 PROCEDURE — 80305 DRUG TEST PRSMV DIR OPT OBS: CPT | Performed by: STUDENT IN AN ORGANIZED HEALTH CARE EDUCATION/TRAINING PROGRAM

## 2022-10-27 PROCEDURE — 12001 RPR S/N/AX/GEN/TRNK 2.5CM/<: CPT | Performed by: FAMILY MEDICINE

## 2022-10-27 NOTE — LETTER
[address] [place] [address]  Phone:  [phone] - Fax:  [phone]   [facility] Progress Report and Disability Certification  Date of Service: 10/27/2022   No Show:  No  Date / Time of Next Visit: 11/3/2022   Claim Information   Patient Name: Alek Elliott  Claim Number:     Employer: [employer]  Date of Injury: 10/27/2022     Insurer / TPA: [employer]  ID / SSN:     Occupation: Slot Tech  Diagnosis: Diagnoses of Work related injury, Laceration of right thigh, initial encounter, and Elevated blood pressure reading were pertinent to this visit.    Medical Information   Related to Industrial Injury? Yes    Subjective Complaints:  DOI: 10/27/2022 MADDIE: Patient was kneeling on the floor using his pockey knife to try and open a monitor at work when the knife slipped and he ended up stabbing himself in his right thigh. Knife was clean. There was blood about one inch up the blade. He denies any pain to the area and has not yet taken anything for pain. Last Tdap was 12/27/2021. He denies prior right leg injury. No secondary employment.   Objective Findings: Constitutional:       General: He is not in acute distress.     Appearance: He is not diaphoretic.   Cardiovascular:      Rate and Rhythm: Normal rate and regular rhythm.      Pulses:           Tibialis posterior pulses are 2+ on the right side.      Heart sounds: Normal heart sounds.   Pulmonary:      Effort: Pulmonary effort is normal. No respiratory distress.      Breath sounds: Normal breath sounds.   Musculoskeletal:         General: Laceration to right, distal, medial thigh approximately 2 cm in length. Hemostasis has been achieved.    Neurological:      Mental Status: He is alert.   Psychiatric:         Mood and Affect: Affect normal.         Judgment: Judgment normal.    Pre-Existing Condition(s):     Assessment:   Initial Visit    Status: Additional  Care Required  Permanent Disability:No    Plan:      Diagnostics:      Comments:  -Return to full duty work  -Laceration repaired per below  -Tetanus is up-to-date  Tylenol and ibuprofen as needed for symptomatic relief    Procedure: Laceration Repair  We discussed risks, benefits, and alternative treatment options. Risks include but are not limited to infection, bleeding, nerve damage, and poor cosmetic outcome. The patient opted to proceed with the laceration repair. A timeout protocol was performed prior to initiating the laceration repair. The area was prepped and draped in the usual, sterile manner.  The site was anesthetized with 3 mL of 1% lidocaine without epinephrine. The wound was copiously irrigated with sterile saline. 4 sutures of 4-0 nylon interrupted sutures were placed, with good wound approximation. There was minimal bleeding with good hemostasis achieved. Polysporin and dressing were applied afterwards. The patient tolerated the procedure well without complications. Standard postprocedure care was explained and return precautions are given.                  Disability Information   Status: Released to Full Duty    From:  10/27/2022  Through: 11/3/2022 Restrictions are: Temporary   Physical Restrictions   Sitting:    Standing:    Stooping:    Bending:      Squatting:    Walking:    Climbing:    Pushing:      Pulling:    Other:    Reaching Above Shoulder (L):   Reaching Above Shoulder (R):       Reaching Below Shoulder (L):    Reaching Below Shoulder (R):      Not to exceed Weight Limits   Carrying(hrs):   Weight Limit(lb):   Lifting(hrs):   Weight  Limit(lb):     Comments:      Repetitive Actions   Hands: i.e. Fine Manipulations from Grasping:     Feet: i.e. Operating Foot Controls:     Driving / Operate Machinery:     Health Care Provider’s Original or Electronic Signature  Raisa Goldman M.D. Health Care Provider’s Original or Electronic Signature    Sammy Santos MD         Clinic Name /  Location: [address]  [place] [address] Clinic Phone Number: Dept: [phone]   Appointment Time: 9:30 Am Visit Start Time: 10:09 AM   Check-In Time:  10:08 Am Visit Discharge Time: 10:29 AM   Original-Treating Physician or Chiropractor    Page 2-Insurer/TPA    Page 3-Employer    Page 4-Employee

## 2022-10-27 NOTE — PROGRESS NOTES
"  Subjective:     56 y.o. male presents for Stab Wound (Left thigh, self induced, from pocket knife)      DOI: 10/27/2022 MADDIE: Patient was kneeling on the floor using his pockey knife to try and open a monitor at work when the knife slipped and he ended up stabbing himself in his right thigh. Knife was clean. There was blood about one inch up the blade. He denies any pain to the area and has not yet taken anything for pain. Last Tdap was 12/27/2021. He denies prior right leg injury. No secondary employment.    PMH:   No pertinent past medical history to this problem  MEDS:  Medications were reviewed in EMR  ALLERGIES:  Allergies were reviewed in EMR  SOCH[employer]:  Works as a   FH:   No pertinent family history to this problem     Objective:     BP (!) 154/78 (BP Location: Right arm, Patient Position: Sitting, BP Cuff Size: Adult)   Pulse 92   Temp 37 °C (98.6 °F) (Temporal)   Resp 14   Ht 1.676 m (5' 6\")   SpO2 100%   BMI 28.11 kg/m²     Constitutional:       General: He is not in acute distress.     Appearance: He is not diaphoretic.   Cardiovascular:      Rate and Rhythm: Normal rate and regular rhythm.      Pulses:           Tibialis posterior pulses are 2+ on the right side.      Heart sounds: Normal heart sounds.   Pulmonary:      Effort: Pulmonary effort is normal. No respiratory distress.      Breath sounds: Normal breath sounds.   Musculoskeletal:         General: Laceration to right, distal, medial thigh approximately 2 cm in length. Hemostasis has been achieved.    Neurological:      Mental Status: He is alert.   Psychiatric:         Mood and Affect: Affect normal.         Judgment: Judgment normal.     Assessment/Plan:     1. Work related injury    2. Laceration of right thigh, initial encounter    3. Elevated blood pressure reading  Released to Full Duty FROM 10/27/2022 TO 11/3/2022     -Return to full duty work  -Laceration repaired per below  -Tetanus is up-to-date  Tylenol and ibuprofen " as needed for symptomatic relief    Procedure: Laceration Repair  We discussed risks, benefits, and alternative treatment options. Risks include but are not limited to infection, bleeding, nerve damage, and poor cosmetic outcome. The patient opted to proceed with the laceration repair. A timeout protocol was performed prior to initiating the laceration repair. The area was prepped and draped in the usual, sterile manner.  The site was anesthetized with 3 mL of 1% lidocaine without epinephrine. The wound was copiously irrigated with sterile saline. 4 sutures of 4-0 nylon interrupted sutures were placed, with good wound approximation. There was minimal bleeding with good hemostasis achieved. Polysporin and dressing were applied afterwards. The patient tolerated the procedure well without complications. Standard postprocedure care was explained and return precautions are given.                    Differential diagnosis, natural history, supportive care, and indications for immediate follow-up discussed.

## 2022-10-27 NOTE — LETTER
"EMPLOYEE’S CLAIM FOR COMPENSATION/ REPORT OF INITIAL TREATMENT  FORM C-4    EMPLOYEE’S CLAIM - PROVIDE ALL INFORMATION REQUESTED   First Name  Alek Last Name  Lexi Birthdate                    1965                Sex  male Claim Number (Insurer’s Use Only)    Home Address  [address] Age  56 y.o. 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 (if applicable)  I was chinging the touch screen    How did this injury or occupational disease occur? (Be specific an answer in detail. Use additional sheet if necessary)  Chanfing the touch screen on slot machine monitor, knife slipped and went into my leg.   If you believe that you have an occupational disease, when did you first have knowledge of the disability and it relationship to your employment?   Witnesses to the Accident  N/A      Nature of Injury or Occupational Disease  Workers' Compensation  Part(s) of Body Injured or Affected  Upper Leg (R), ,     I certify that the above is true and correct to the best of my knowledge and that I have provided this information in " order to obtain the benefits of [place]  I hereby authorize any physician, chiropractor, surgeon, practitioner, or other person, any hospital, including [facility] or [facility], any medical service organization, any insurance company, or other institution or organization to release to each other, any medical or other information, including benefits paid or payable, pertinent to this injury or disease, except information relative to diagnosis, treatment and/or counseling for AIDS, psychological conditions, alcohol or controlled substances, for which I must give specific authorization.  A Photostat of this authorization shall be as valid as the original.     Date   Place Employee’s Original or  *Electronic Signature   THIS REPORT MUST BE COMPLETED AND MAILED WITHIN 3 WORKING DAYS OF TREATMENT   Place  [facility]  Name of Facility  [facility]   Date  10/27/2022 Diagnosis and Description of Injury or Occupational Disease  (Y99.0) Work related injury  (S71.111A) Laceration of right thigh, initial encounter  (R03.0) Elevated blood pressure reading Is there evidence the injured employee was under the influence of alcohol and/or another controlled substance at the time of accident?  ? No ? Yes (if yes, please explain)    Hour  10:09 AM   Diagnoses of Work related injury, Laceration of right thigh, initial encounter, and Elevated blood pressure reading were pertinent to this visit. No   Treatment  -Return to full duty work  -Laceration repaired per below  -Tetanus is up-to-date  Tylenol and ibuprofen as needed for symptomatic relief  Have you advised the patient to remain off work five days or     more?    X-Ray Findings      ? Yes Indicate dates:   From   To      From information given by the employee, together with medical evidence, can        you directly connect this injury or  "occupational disease as job incurred?  Yes ? No If no, is the injured employee capable of:  ? full duty  Yes ? modified duty      Is additional medical care by a physician indicated?  Yes If Modified Duty, Specify any Limitations / Restrictions      Do you know of any previous injury or disease contributing to this condition or occupational disease?  ? Yes ? No (Explain if yes)                          No   Date  10/27/2022 Print Health Care Provider's   Antione Goldman M.D. I certify the employer’s copy of  this form was mailed on:   Address  [address] Insurer’s Use Only     [facility] Zip  [address]    Provider’s Tax ID Number  [id number] Telephone  Dept: [phone]             Health Care Provider’s Original or Electronic Signature  e-ANTIONE Corona M.D. Degree (MD,DO, DC,PA-C,APRN)   MD      * Complete and attach Release of Information (Form C-4A) when injured employee signs C-4 Form electronically  ORIGINAL - TREATING HEALTHCARE PROVIDER PAGE 2 - INSURER/TPA PAGE 3 - EMPLOYER PAGE 4 - EMPLOYEE             Form C-4 (rev.08/21)           BRIEF DESCRIPTION OF RIGHTS AND BENEFITS  (Pursuant to [place] 616C.[place])    Notice of Injury or Occupational Disease (Incident Report Form C-1): If an injury or occupational disease (OD) arises out of and in the course of employment, you must provide written notice to your employer as soon as practicable, but no later than 7 days after the accident or OD. Your employer shall maintain a sufficient supply of the required forms.    Claim for Compensation (Form C-4): If medical treatment is sought, the form C-4 is available at the place of initial treatment. A completed \"Claim for Compensation\" (Form C-4) must be filed within 90 days after an accident or OD. The treating physician or chiropractor must, within 3 working days after treatment, complete and mail to the employer, the employer's insurer and third-party , the Claim for " Compensation.    Medical Treatment: If you require medical treatment for your on-the-job injury or OD, you may be required to select a physician or chiropractor from a list provided by your workers’ compensation insurer, if it has contracted with an Organization for Managed Care (MCO) or Preferred Provider Organization (PPO) or providers of health care. If your employer has not entered into a contract with an MCO or PPO, you may select a physician or chiropractor from the Panel of Physicians and Chiropractors. Any medical costs related to your industrial injury or OD will be paid by your insurer.    Temporary Total Disability (TTD): If your doctor has certified that you are unable to work for a period of at least 5 consecutive days, or 5 cumulative days in a 20-day period, or places restrictions on you that your employer does not accommodate, you may be entitled to TTD compensation.    Temporary Partial Disability (TPD): If the wage you receive upon reemployment is less than the compensation for TTD to which you are entitled, the insurer may be required to pay you TPD compensation to make up the difference. TPD can only be paid for a maximum of 24 months.    Permanent Partial Disability (PPD): When your medical condition is stable and there is an indication of a PPD as a result of your injury or OD, within 30 days, your insurer must arrange for an evaluation by a rating physician or chiropractor to determine the degree of your PPD. The amount of your PPD award depends on the date of injury, the results of the PPD evaluation, your age and wage.    Permanent Total Disability (PTD): If you are medically certified by a treating physician or chiropractor as permanently and totally disabled and have been granted a PTD status by your insurer, you are entitled to receive monthly benefits not to exceed 66 2/3% of your average monthly wage. The amount of your PTD payments is subject to reduction if you previously received a  [facility]-sum PPD award.    Vocational Rehabilitation Services: You may be eligible for vocational rehabilitation services if you are unable to return to the job due to a permanent physical impairment or permanent restrictions as a result of your injury or occupational disease.    Transportation and Per Bhargav Reimbursement: You may be eligible for travel expenses and per bhargav associated with medical treatment.    Reopening: You may be able to reopen your claim if your condition worsens after claim closure.     Appeal Process: If you disagree with a written determination issued by the insurer or the insurer does not respond to your request, you may appeal to the Department of Administration, , by following the instructions contained in your determination letter. You must appeal the determination within 70 days from the date of the determination letter at [address], [address], [place], [place] [address], or [address], [address], [place], [place] [address]. If you disagree with the  decision, you may appeal to the Department of Administration, . You must file your appeal within 30 days from the date of the  decision letter at [address], [address], [place], [place] [address], or [address], [address], [place], [place] [address]. If you disagree with a decision of an , you may file a petition for judicial review with the District Court. You must do so within 30 days of the Appeal Officer’s decision. You may be represented by an  at your own expense or you may contact the [facility] for possible representation.    [place]  for Injured Workers (NAIW): If you disagree with a  decision, you may request that NAIW represent you without charge at an  Hearing. For information regarding denial of benefits, you may contact the [facility] at: [address], [address], [place]  [address], [phone], or [address], [address], [place] [address], [phone]    To File a Complaint with the Division: If you wish to file a complaint with the  of the Division of Industrial Relations (DIR),  please contact the Workers’ Compensation Section, [address], [address], [place], [place] [address], telephone (960) [phone], or [address], [address], [place], [place] [address], telephone ([phone]) [phone].    For assistance with Workers’ Compensation Issues: You may contact the [facility] Office for Consumer Health Assistance, [address], [address], [place], [place] [address], Toll Free 1-534.844.6762, Web site: [email or web address] E-mail: [email or web address]              __________________________________________________________________                                    _________________            Employee Name / Signature                                                                                                                            Date                                                                                                                                                                                                                              D-2 (rev. 10/20)

## 2022-10-29 ENCOUNTER — HOSPITAL ENCOUNTER (OUTPATIENT)
Dept: LAB | Facility: MEDICAL CENTER | Age: 57
End: 2022-10-29
Attending: FAMILY MEDICINE
Payer: COMMERCIAL

## 2022-10-29 DIAGNOSIS — Z12.5 SCREENING FOR PROSTATE CANCER: ICD-10-CM

## 2022-10-29 DIAGNOSIS — E78.2 MIXED HYPERLIPIDEMIA: ICD-10-CM

## 2022-10-29 DIAGNOSIS — R73.03 PREDIABETES: ICD-10-CM

## 2022-10-29 DIAGNOSIS — E55.9 VITAMIN D DEFICIENCY: ICD-10-CM

## 2022-10-29 LAB
25(OH)D3 SERPL-MCNC: 43 NG/ML (ref 30–100)
ALBUMIN SERPL BCP-MCNC: 4.6 G/DL (ref 3.2–4.9)
ALBUMIN/GLOB SERPL: 1.5 G/DL
ALP SERPL-CCNC: 100 U/L (ref 30–99)
ALT SERPL-CCNC: 15 U/L (ref 2–50)
ANION GAP SERPL CALC-SCNC: 10 MMOL/L (ref 7–16)
AST SERPL-CCNC: 22 U/L (ref 12–45)
BILIRUB SERPL-MCNC: 0.6 MG/DL (ref 0.1–1.5)
BUN SERPL-MCNC: 15 MG/DL (ref 8–22)
CALCIUM SERPL-MCNC: 9.4 MG/DL (ref 8.5–10.5)
CHLORIDE SERPL-SCNC: 101 MMOL/L (ref 96–112)
CHOLEST SERPL-MCNC: 187 MG/DL (ref 100–199)
CO2 SERPL-SCNC: 26 MMOL/L (ref 20–33)
CREAT SERPL-MCNC: 0.95 MG/DL (ref 0.5–1.4)
EST. AVERAGE GLUCOSE BLD GHB EST-MCNC: 126 MG/DL
GFR SERPLBLD CREATININE-BSD FMLA CKD-EPI: 93 ML/MIN/1.73 M 2
GLOBULIN SER CALC-MCNC: 3.1 G/DL (ref 1.9–3.5)
GLUCOSE SERPL-MCNC: 106 MG/DL (ref 65–99)
HBA1C MFR BLD: 6 % (ref 4–5.6)
HDLC SERPL-MCNC: 31 MG/DL
LDLC SERPL CALC-MCNC: 117 MG/DL
POTASSIUM SERPL-SCNC: 4.5 MMOL/L (ref 3.6–5.5)
PROT SERPL-MCNC: 7.7 G/DL (ref 6–8.2)
PSA SERPL-MCNC: 3.36 NG/ML (ref 0–4)
SODIUM SERPL-SCNC: 137 MMOL/L (ref 135–145)
TRIGL SERPL-MCNC: 194 MG/DL (ref 0–149)

## 2022-10-29 PROCEDURE — 84153 ASSAY OF PSA TOTAL: CPT

## 2022-10-29 PROCEDURE — 80053 COMPREHEN METABOLIC PANEL: CPT

## 2022-10-29 PROCEDURE — 83036 HEMOGLOBIN GLYCOSYLATED A1C: CPT

## 2022-10-29 PROCEDURE — 82306 VITAMIN D 25 HYDROXY: CPT

## 2022-10-29 PROCEDURE — 80061 LIPID PANEL: CPT

## 2022-10-29 PROCEDURE — 36415 COLL VENOUS BLD VENIPUNCTURE: CPT

## 2022-11-01 ENCOUNTER — OFFICE VISIT (OUTPATIENT)
Dept: MEDICAL GROUP | Facility: MEDICAL CENTER | Age: 57
End: 2022-11-01
Payer: COMMERCIAL

## 2022-11-01 VITALS
SYSTOLIC BLOOD PRESSURE: 110 MMHG | HEIGHT: 65 IN | WEIGHT: 167.55 LBS | RESPIRATION RATE: 17 BRPM | BODY MASS INDEX: 27.92 KG/M2 | HEART RATE: 64 BPM | DIASTOLIC BLOOD PRESSURE: 64 MMHG | OXYGEN SATURATION: 98 % | TEMPERATURE: 97 F

## 2022-11-01 DIAGNOSIS — E78.2 MIXED HYPERLIPIDEMIA: ICD-10-CM

## 2022-11-01 DIAGNOSIS — E55.9 VITAMIN D DEFICIENCY: ICD-10-CM

## 2022-11-01 DIAGNOSIS — R73.03 PREDIABETES: ICD-10-CM

## 2022-11-01 PROCEDURE — 99214 OFFICE O/P EST MOD 30 MIN: CPT | Performed by: FAMILY MEDICINE

## 2022-11-01 RX ORDER — ATORVASTATIN CALCIUM 20 MG/1
20 TABLET, FILM COATED ORAL NIGHTLY
Qty: 90 TABLET | Refills: 3 | Status: SHIPPED | OUTPATIENT
Start: 2022-11-01

## 2022-11-01 ASSESSMENT — ENCOUNTER SYMPTOMS
FEVER: 0
WHEEZING: 0
CHILLS: 0
PALPITATIONS: 0
SHORTNESS OF BREATH: 0
COUGH: 0

## 2022-11-01 NOTE — ASSESSMENT & PLAN NOTE
Chronic problem, uncontrolled, A1c got increased in number to previous numbers.  Start metformin 1 tablet 2 times daily.  Recheck labs in 6 months.  Counseled regarding dietary modification and exercise

## 2022-11-01 NOTE — PROGRESS NOTES
FAMILY MEDICINE VISIT                                                               Chief complaint::Diagnoses of Mixed hyperlipidemia, Prediabetes, and Vitamin D deficiency were pertinent to this visit.    History of present illness: Alek Carvalho is a 56 y.o. male who presented for labs for hyperlipidemia, prediabetes, vitamin D.    Problem   Vitamin D Deficiency    Recent labs showed vitamin D level at 43.  He is currently not taking vitamin D supplementation.  Previous labs showed low vitamin D before and he was on high-dose supplementation     Prediabetes    Recent A1c came back at 6.0.  He is currently not on any medication.  His A1c got elevated when compared to previous numbers.  Reports that he tries to eat healthy diet and is physically active    Lab Results   Component Value Date/Time    HBA1C 6.0 (H) 10/29/2022 09:00 AM         Mixed Hyperlipidemia    Recent cholesterol numbers showed improvement in total cholesterol, triglyceride and LDL level.  Levels are still elevated.  He is not taking statins which I prescribed at last visit as he ran out of refills.  Reports pharmacy did not provided him medication    Lab Results   Component Value Date/Time    CHOLSTRLTOT 187 10/29/2022 0900    TRIGLYCERIDE 194 (H) 10/29/2022 0900    HDL 31 (A) 10/29/2022 0900     (H) 10/29/2022 0900               Review of systems:     Review of Systems   Constitutional:  Negative for chills, fever and malaise/fatigue.   Respiratory:  Negative for cough, shortness of breath and wheezing.    Cardiovascular:  Negative for chest pain, palpitations and leg swelling.      Medications and Allergies:     Current Outpatient Medications   Medication Sig Dispense Refill    atorvastatin (LIPITOR) 20 MG Tab Take 1 Tablet by mouth every evening. 90 Tablet 3    metFORMIN (GLUCOPHAGE) 500 MG Tab Take 1 Tablet by mouth 2 times a day with meals. 180 Tablet 3     No current facility-administered medications for this visit.       "    Vitals:    /64 (BP Location: Left arm, Patient Position: Sitting, BP Cuff Size: Adult)   Pulse 64   Temp 36.1 °C (97 °F)   Resp 17   Ht 1.651 m (5' 5\")   Wt 76 kg (167 lb 8.8 oz)   SpO2 98%  Body mass index is 27.88 kg/m².    Physical Exam:     Physical Exam  Constitutional:       General: He is not in acute distress.  HENT:      Head: Normocephalic and atraumatic.      Right Ear: External ear normal.      Left Ear: External ear normal.   Eyes:      Conjunctiva/sclera: Conjunctivae normal.   Cardiovascular:      Rate and Rhythm: Normal rate.   Pulmonary:      Effort: Pulmonary effort is normal. No respiratory distress.   Musculoskeletal:         General: No deformity.      Cervical back: Neck supple.   Skin:     Findings: No rash.   Neurological:      Mental Status: He is alert.      Gait: Gait is intact.   Psychiatric:         Mood and Affect: Mood and affect normal.         Judgment: Judgment normal.        Labs:  I reviewed with patient recent labs resulted on 10/29/2022.    Assessment/Plan:         Problem List Items Addressed This Visit       Vitamin D deficiency     Chronic problem, stable, recommended to take vitamin D 2000 international units daily.  Recheck labs in 6-month.         Relevant Orders    VITAMIN D,25 HYDROXY (DEFICIENCY)    Prediabetes     Chronic problem, uncontrolled, A1c got increased in number to previous numbers.  Start metformin 1 tablet 2 times daily.  Recheck labs in 6 months.  Counseled regarding dietary modification and exercise         Relevant Medications    metFORMIN (GLUCOPHAGE) 500 MG Tab    Other Relevant Orders    HEMOGLOBIN A1C    Mixed hyperlipidemia     Chronic problem, uncontrolled, start Lipitor 20mg daily.  Counseled regarding dietary modification.  Recheck labs in 6-month.         Relevant Medications    atorvastatin (LIPITOR) 20 MG Tab    Other Relevant Orders    Comp Metabolic Panel    Lipid Profile        Please note that this dictation was created " using voice recognition software. I have made every reasonable attempt to correct obvious errors, but I expect that there are errors of grammar and possibly content that I did not discover before finalizing the note.    Follow up in 6 months for lab follow up.

## 2022-11-01 NOTE — ASSESSMENT & PLAN NOTE
Chronic problem, uncontrolled, start Lipitor 20mg daily.  Counseled regarding dietary modification.  Recheck labs in 6-month.

## 2022-11-01 NOTE — ASSESSMENT & PLAN NOTE
Chronic problem, stable, recommended to take vitamin D 2000 international units daily.  Recheck labs in 6-month.

## 2022-11-03 ENCOUNTER — OCCUPATIONAL MEDICINE (OUTPATIENT)
Dept: URGENT CARE | Facility: PHYSICIAN GROUP | Age: 57
End: 2022-11-03
Payer: COMMERCIAL

## 2022-11-03 VITALS
HEART RATE: 66 BPM | TEMPERATURE: 96.7 F | HEIGHT: 65 IN | BODY MASS INDEX: 27.82 KG/M2 | SYSTOLIC BLOOD PRESSURE: 112 MMHG | RESPIRATION RATE: 16 BRPM | DIASTOLIC BLOOD PRESSURE: 68 MMHG | WEIGHT: 167 LBS | OXYGEN SATURATION: 97 %
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
## 2022-11-03 DIAGNOSIS — S71.111D LACERATION OF RIGHT THIGH, SUBSEQUENT ENCOUNTER: ICD-10-CM

## 2022-11-03 PROCEDURE — 99212 OFFICE O/P EST SF 10 MIN: CPT

## 2022-11-03 ASSESSMENT — PAIN SCALES - GENERAL: PAINLEVEL: NO PAIN

## 2022-11-03 NOTE — LETTER
[address] [place] [address]  Phone:  [phone] - Fax:  [phone]   [facility] Progress Report and Disability Certification  Date of Service: 11/3/2022   No Show:  No  Date / Time of Next Visit: 11/7/2022   Claim Information   Patient Name: Alek Carvalho  Claim Number:     Employer: [employer]  Date of Injury: 10/27/2022     Insurer / TPA: [employer]  ID / SSN:     Occupation: Slot Tech  Diagnosis: Diagnoses of Laceration of right thigh, subsequent encounter and Work related injury were pertinent to this visit.    Medical Information   Related to Industrial Injury? Yes    Subjective Complaints:  Patient presents for his first WC FV today. Reports slight numbness just above the medial knee. He denies pain, drainage, fever, chills, nights, nausea, vomiting, diarrhea. Denies problems completing his work.    Objective Findings: There is a small laceration in the right medial upper leg. There are four sutures in place. The wound is clean, dry. The middle of the laceration is not completely approximated. The wound is non-tender to palpation, no ecchymosis, erythema, warmth, or edema. Sensation is intact to light and sharp touch and symmetric to the right lower extremity.    Pre-Existing Condition(s):     Assessment:   Condition Improved    Status: Additional Care Required  Permanent Disability:No    Plan:      Diagnostics:      Comments:       Disability Information   Status: Released to Full Duty    From:  11/3/2022  Through: 11/7/2022 Restrictions are:     Physical Restrictions   Sitting:    Standing:    Stooping:    Bending:      Squatting:    Walking:    Climbing:    Pushing:      Pulling:    Other:    Reaching Above Shoulder (L):   Reaching Above Shoulder (R):       Reaching Below Shoulder (L):    Reaching Below Shoulder (R):      Not to exceed Weight Limits   Carrying(hrs):   Weight Limit(lb):    Lifting(hrs):   Weight  Limit(lb):     Comments: D39 completed today  No evidence of infection. Sutures left in place today. Will likely be removed on Monday 11/7/22.   Wound is not well approximated and needs a few more days to heal.   Patient is to continue with home care instructions provided during initial WC visit. He is to apply thin layer of polysporin twice a day.   Tylenol/Ibuprofen per package instructions.   Patient to return on Monday 11/7/22.       Repetitive Actions   Hands: i.e. Fine Manipulations from Grasping:     Feet: i.e. Operating Foot Controls:     Driving / Operate Machinery:     Health Care Provider’s Original or Electronic Signature  NII FreedP. Health Care Provider’s Original or Electronic Signature    Sammy Santos MD         Clinic Name / Location: [address] [address] Clinic Phone Number: Dept: [phone]   Appointment Time: 1:30 Pm Visit Start Time: 3:08 PM   Check-In Time:  1:27 Pm Visit Discharge Time: 3:56 Pm    Original-Treating Physician or Chiropractor    Page 2-Insurer/TPA    Page 3-Employer    Page 4-Employee

## 2022-11-03 NOTE — PROGRESS NOTES
"Subjective:     Alek Carvalho is a 56 y.o. male who presents for Work-Related Injury (Wc fv )      Patient presents for his first WC FV today. Reports slight numbness just above the medial knee. He denies pain, drainage, fever, chills, nights, nausea, vomiting, diarrhea. Denies problems completing his work.     PMH:   No pertinent past medical history to this problem  MEDS:  Medications were reviewed in EMR  ALLERGIES:  Allergies were reviewed in EMR  SOCH[employer]:  Works as a [employer] Tech  FH:   No pertinent family history to this problem       Objective:     /68   Pulse 66   Temp 35.9 °C (96.7 °F) (Temporal)   Resp 16   Ht 1.651 m (5' 5\")   Wt 75.8 kg (167 lb)   SpO2 97%   BMI 27.79 kg/m²     There is a small laceration in the right medial upper leg. There are four sutures in place. The wound is clean, dry. The middle of the laceration is not completely approximated. The wound is non-tender to palpation, no ecchymosis, erythema, warmth, or edema. Sensation is intact to light and sharp touch and symmetric to the right lower extremity.     Assessment/Plan:       1. Laceration of right thigh, subsequent encounter    2. Work related injury  Released to Full Duty FROM 11/3/2022 TO 11/7/2022  D39 completed today  No evidence of infection. Sutures left in place today. Will likely be removed on Monday 11/7/22.   Wound is not well approximated and needs a few more days to heal.   Patient is to continue with home care instructions provided during initial WC visit. He is to apply thin layer of polysporin twice a day.   Tylenol/Ibuprofen per package instructions.   Patient to return on Monday 11/7/22.        Differential diagnosis, natural history, supportive care, and indications for immediate follow-up discussed.    Maricruz Ovalle DNP  "

## 2022-11-07 ENCOUNTER — APPOINTMENT (OUTPATIENT)
Dept: URGENT CARE | Facility: PHYSICIAN GROUP | Age: 57
End: 2022-11-07
Payer: COMMERCIAL

## 2022-11-08 ENCOUNTER — OCCUPATIONAL MEDICINE (OUTPATIENT)
Dept: URGENT CARE | Facility: PHYSICIAN GROUP | Age: 57
End: 2022-11-08
Payer: COMMERCIAL

## 2022-11-08 VITALS
RESPIRATION RATE: 12 BRPM | SYSTOLIC BLOOD PRESSURE: 114 MMHG | WEIGHT: 167 LBS | OXYGEN SATURATION: 98 % | HEART RATE: 97 BPM | TEMPERATURE: 98.1 F | DIASTOLIC BLOOD PRESSURE: 64 MMHG | BODY MASS INDEX: 27.82 KG/M2 | HEIGHT: 65 IN

## 2022-11-08 DIAGNOSIS — S71.111D LACERATION OF RIGHT THIGH, SUBSEQUENT ENCOUNTER: ICD-10-CM

## 2022-11-08 DIAGNOSIS — R20.0 NUMBNESS: ICD-10-CM

## 2022-11-08 DIAGNOSIS — Z48.02 ENCOUNTER FOR REMOVAL OF SUTURES: ICD-10-CM

## 2022-11-08 PROCEDURE — 99212 OFFICE O/P EST SF 10 MIN: CPT | Performed by: FAMILY MEDICINE

## 2022-11-08 ASSESSMENT — PAIN SCALES - GENERAL: PAINLEVEL: NO PAIN

## 2022-11-08 NOTE — PROGRESS NOTES
Chief Complaint:    Chief Complaint   Patient presents with    Follow-Up     Suture removal, on R leg        History of Present Illness:    DOI: 10/27/22. Sutures placed to right leg laceration on 10/27/22. Some numbness around right knee but still everything is functioning strength wise.      Past Medical History:    Past Medical History:   Diagnosis Date    Abnormal glucose tolerance test     History of TIA (transient ischemic attack) 2013    Low HDL (under 40)      Past Surgical History:    History reviewed. No pertinent surgical history.    Social History:    Social History     Socioeconomic History    Marital status:      Spouse name: Not on file    Number of children: Not on file    Years of education: Not on file    Highest education level: Not on file   Occupational History    Not on file   Tobacco Use    Smoking status: Every Day     Packs/day: 0.50     Years: 28.00     Pack years: 14.00     Types: Cigarettes     Last attempt to quit: 2014     Years since quittin.5    Smokeless tobacco: Never   Substance and Sexual Activity    Alcohol use: No    Drug use: No    Sexual activity: Yes     Comment: Work as    Other Topics Concern    Not on file   Social History Narrative    Not on file     Social Determinants of Health     Financial Resource Strain: Not on file   Food Insecurity: Not on file   Transportation Needs: Not on file   Physical Activity: Not on file   Stress: Not on file   Social Connections: Not on file   Intimate Partner Violence: Not on file   Housing Stability: Not on file     Family History:    Family History   Problem Relation Age of Onset    Cancer Father         lung?    No Known Problems Mother      Medications:    Current Outpatient Medications on File Prior to Visit   Medication Sig Dispense Refill    atorvastatin (LIPITOR) 20 MG Tab Take 1 Tablet by mouth every evening. 90 Tablet 3    metFORMIN (GLUCOPHAGE) 500 MG Tab Take 1 Tablet by mouth 2 times a day with  "meals. 180 Tablet 3     No current facility-administered medications on file prior to visit.     Allergies:    No Known Allergies      Vitals:    Vitals:    22 1347   BP: 114/64   Pulse: 97   Resp: 12   Temp: 36.7 °C (98.1 °F)   TempSrc: Temporal   SpO2: 98%   Weight: 75.8 kg (167 lb)   Height: 1.651 m (5' 5\")       Physical Exam:    Constitutional: Vital signs reviewed. Appears well-developed and well-nourished. No acute distress.   Eyes: Sclera white, conjunctivae clear.  ENT: External ears normal. Hearing normal.  Pulmonary/Chest: Respirations non-labored.  Musculoskeletal: Normal gait. Normal range of motion. No tenderness to palpation. No muscular atrophy or weakness.  Neurological: Alert and oriented to person, place, and time. Muscle tone normal. Coordination normal.   Skin: Right thigh has wound with 4 sutures in place - wound is clean, dry, and intact without signs of infection.  Psychiatric: Normal mood and affect. Behavior is normal. Judgment and thought content normal.       Medical Decision Makin. Laceration of right thigh, subsequent encounter    2. Encounter for removal of sutures    3. Numbness       Discussed with him DDX, management options, and risks, benefits, and alternatives to treatment plan agreed upon.    DOI: 10/27/22. Sutures placed to right leg laceration on 10/27/22. Some numbness around right knee but still everything is functioning strength wise.    Right thigh has wound with 4 sutures in place - wound is clean, dry, and intact without signs of infection.    All sutures removed without complication.    Advised numbness around right knee may be due to sensory nerve injury which may improve with time, but can take months to return to normal. If everything is working well otherwise, nothing to do for this.    Full duty.    Discharged / MMI.  "

## 2022-11-08 NOTE — LETTER
[address] [place] [address]  Phone:  [phone] - Fax:  [phone]   [facility] Progress Report and Disability Certification  Date of Service: 11/8/2022   No Show:  No  Date / Time of Next Visit:     Claim Information   Patient Name: Alek Carvalho  Claim Number:     Employer: [employer]  Date of Injury: 10/27/2022     Insurer / TPA: [employer]  ID / SSN:     Occupation: Slot Tech  Diagnosis: Diagnoses of Laceration of right thigh, subsequent encounter, Encounter for removal of sutures, and Numbness were pertinent to this visit.    Medical Information   Related to Industrial Injury? Yes    Subjective Complaints:  DOI: 10/27/22. Sutures placed to right leg laceration on 10/27/22. Some numbness around right knee but still everything is functioning strength wise.   Objective Findings: Right thigh has wound with 4 sutures in place - wound is clean, dry, and intact without signs of infection.   Pre-Existing Condition(s):     Assessment:   Condition Improved    Status: Discharged /  MMI  Permanent Disability:No    Plan:      Diagnostics:      Comments:  All sutures removed without complication. Advised numbness around right knee may be due to sensory nerve injury which may improve with time, but can take months to return to normal. If everything is working well otherwise, nothing to do for this.    Disability Information   Status: Released to Full Duty    From:     Through:   Restrictions are:     Physical Restrictions   Sitting:    Standing:    Stooping:    Bending:      Squatting:    Walking:    Climbing:    Pushing:      Pulling:    Other:    Reaching Above Shoulder (L):   Reaching Above Shoulder (R):       Reaching Below Shoulder (L):    Reaching Below Shoulder (R):      Not to exceed Weight Limits   Carrying(hrs):   Weight Limit(lb):   Lifting(hrs):   Weight  Limit(lb):     Comments:      Repetitive  Actions   Hands: i.e. Fine Manipulations from Grasping:     Feet: i.e. Operating Foot Controls:     Driving / Operate Machinery:     Health Care Provider’s Original or Electronic Signature  Ric Johnson M.D. Health Care Provider’s Original or Electronic Signature    Sammy Santos MD         Clinic Name / Location: [address] [address] Clinic Phone Number: Dept: [phone]   Appointment Time: 1:30 Pm Visit Start Time: 1:47 PM   Check-In Time:  1:26 Pm Visit Discharge Time: 2:05 PM   Original-Treating Physician or Chiropractor    Page 2-Insurer/TPA    Page 3-Employer    Page 4-Employee

## 2023-04-29 ENCOUNTER — HOSPITAL ENCOUNTER (OUTPATIENT)
Dept: LAB | Facility: MEDICAL CENTER | Age: 58
End: 2023-04-29
Attending: FAMILY MEDICINE
Payer: COMMERCIAL

## 2023-04-29 DIAGNOSIS — R73.03 PREDIABETES: ICD-10-CM

## 2023-04-29 DIAGNOSIS — E55.9 VITAMIN D DEFICIENCY: ICD-10-CM

## 2023-04-29 DIAGNOSIS — E78.2 MIXED HYPERLIPIDEMIA: ICD-10-CM

## 2023-04-29 LAB
25(OH)D3 SERPL-MCNC: 31 NG/ML (ref 30–100)
ALBUMIN SERPL BCP-MCNC: 4 G/DL (ref 3.2–4.9)
ALBUMIN/GLOB SERPL: 1.3 G/DL
ALP SERPL-CCNC: 88 U/L (ref 30–99)
ALT SERPL-CCNC: 16 U/L (ref 2–50)
ANION GAP SERPL CALC-SCNC: 11 MMOL/L (ref 7–16)
AST SERPL-CCNC: 17 U/L (ref 12–45)
BILIRUB SERPL-MCNC: 0.3 MG/DL (ref 0.1–1.5)
BUN SERPL-MCNC: 14 MG/DL (ref 8–22)
CALCIUM ALBUM COR SERPL-MCNC: 8.8 MG/DL (ref 8.5–10.5)
CALCIUM SERPL-MCNC: 8.8 MG/DL (ref 8.5–10.5)
CHLORIDE SERPL-SCNC: 107 MMOL/L (ref 96–112)
CHOLEST SERPL-MCNC: 150 MG/DL (ref 100–199)
CO2 SERPL-SCNC: 22 MMOL/L (ref 20–33)
CREAT SERPL-MCNC: 0.86 MG/DL (ref 0.5–1.4)
EST. AVERAGE GLUCOSE BLD GHB EST-MCNC: 123 MG/DL
FASTING STATUS PATIENT QL REPORTED: NORMAL
GFR SERPLBLD CREATININE-BSD FMLA CKD-EPI: 101 ML/MIN/1.73 M 2
GLOBULIN SER CALC-MCNC: 3 G/DL (ref 1.9–3.5)
GLUCOSE SERPL-MCNC: 106 MG/DL (ref 65–99)
HBA1C MFR BLD: 5.9 % (ref 4–5.6)
HDLC SERPL-MCNC: 30 MG/DL
LDLC SERPL CALC-MCNC: 80 MG/DL
POTASSIUM SERPL-SCNC: 4.1 MMOL/L (ref 3.6–5.5)
PROT SERPL-MCNC: 7 G/DL (ref 6–8.2)
SODIUM SERPL-SCNC: 140 MMOL/L (ref 135–145)
TRIGL SERPL-MCNC: 202 MG/DL (ref 0–149)

## 2023-04-29 PROCEDURE — 80061 LIPID PANEL: CPT

## 2023-04-29 PROCEDURE — 80053 COMPREHEN METABOLIC PANEL: CPT

## 2023-04-29 PROCEDURE — 82306 VITAMIN D 25 HYDROXY: CPT

## 2023-04-29 PROCEDURE — 36415 COLL VENOUS BLD VENIPUNCTURE: CPT

## 2023-04-29 PROCEDURE — 83036 HEMOGLOBIN GLYCOSYLATED A1C: CPT

## 2023-05-05 ENCOUNTER — OFFICE VISIT (OUTPATIENT)
Dept: MEDICAL GROUP | Facility: MEDICAL CENTER | Age: 58
End: 2023-05-05
Payer: COMMERCIAL

## 2023-05-05 VITALS
HEIGHT: 65 IN | OXYGEN SATURATION: 96 % | BODY MASS INDEX: 26.89 KG/M2 | TEMPERATURE: 98 F | DIASTOLIC BLOOD PRESSURE: 60 MMHG | WEIGHT: 161.38 LBS | HEART RATE: 67 BPM | SYSTOLIC BLOOD PRESSURE: 110 MMHG

## 2023-05-05 DIAGNOSIS — Z23 NEED FOR VACCINATION: ICD-10-CM

## 2023-05-05 DIAGNOSIS — Z12.5 SCREENING FOR PROSTATE CANCER: ICD-10-CM

## 2023-05-05 DIAGNOSIS — E55.9 VITAMIN D DEFICIENCY: ICD-10-CM

## 2023-05-05 DIAGNOSIS — Z11.4 SCREENING FOR HIV (HUMAN IMMUNODEFICIENCY VIRUS): ICD-10-CM

## 2023-05-05 DIAGNOSIS — E78.2 MIXED HYPERLIPIDEMIA: ICD-10-CM

## 2023-05-05 DIAGNOSIS — R73.03 PREDIABETES: ICD-10-CM

## 2023-05-05 PROCEDURE — 90746 HEPB VACCINE 3 DOSE ADULT IM: CPT | Performed by: FAMILY MEDICINE

## 2023-05-05 PROCEDURE — 90471 IMMUNIZATION ADMIN: CPT | Performed by: FAMILY MEDICINE

## 2023-05-05 PROCEDURE — 99214 OFFICE O/P EST MOD 30 MIN: CPT | Mod: 25 | Performed by: FAMILY MEDICINE

## 2023-05-05 ASSESSMENT — ENCOUNTER SYMPTOMS
CHILLS: 0
FEVER: 0
PALPITATIONS: 0

## 2023-05-05 ASSESSMENT — PATIENT HEALTH QUESTIONNAIRE - PHQ9: CLINICAL INTERPRETATION OF PHQ2 SCORE: 0

## 2023-05-05 NOTE — ASSESSMENT & PLAN NOTE
Chronic problem, unstable, continue to work on diet and exercise.  Continue metformin 500 mg 2 times daily.

## 2023-05-05 NOTE — ASSESSMENT & PLAN NOTE
Chronic problem, unstable, counseled regarding dietary modification and exercise.  Recheck labs in 6 months.  Continue Lipitor 20 mg daily.  Recommended to start taking aspirin 81 mg daily

## 2023-05-05 NOTE — ASSESSMENT & PLAN NOTE
Chronic problem, unstable, recommended to take vitamin D 2000 international units daily.  Recheck labs with next blood work.

## 2023-05-05 NOTE — PROGRESS NOTES
"FAMILY MEDICINE VISIT                                                               Chief complaint::Diagnoses of Mixed hyperlipidemia, Prediabetes, Vitamin D deficiency, Screening for HIV (human immunodeficiency virus), Screening for prostate cancer, and Need for vaccination were pertinent to this visit.    History of present illness: Alek Carvalho is a 57 y.o. male who presented for lab follow-up.      Problem   Vitamin D Deficiency    Recent labs showed vitamin D level a 31.  He is currently not taking vitamin D supplementation.  Previous labs showed low vitamin D before and he was on high-dose supplementation     Prediabetes    Recent A1c came back at 5.9 which mildly improved from 6.0.  He is on metformin 500 mg 2 times daily.    Lab Results   Component Value Date/Time    HBA1C 5.9 (H) 04/29/2023 08:12 AM         Mixed Hyperlipidemia    Recent cholesterol panel showed improvement in total cholesterol and LDL level.  Triglyceride numbers are elevated.  He is on Lipitor 20 g daily    Lab Results   Component Value Date/Time    CHOLSTRLTOT 150 04/29/2023 0812    TRIGLYCERIDE 202 (H) 04/29/2023 0812    HDL 30 (A) 04/29/2023 0812    LDL 80 04/29/2023 0812           Review of systems:     Review of Systems   Constitutional:  Negative for chills, fever and malaise/fatigue.   Cardiovascular:  Negative for chest pain, palpitations and leg swelling.      Medications and Allergies:     Current Outpatient Medications   Medication Sig Dispense Refill    atorvastatin (LIPITOR) 20 MG Tab Take 1 Tablet by mouth every evening. 90 Tablet 3    metFORMIN (GLUCOPHAGE) 500 MG Tab Take 1 Tablet by mouth 2 times a day with meals. 180 Tablet 3     No current facility-administered medications for this visit.          Vitals:    /60   Pulse 67   Temp 36.7 °C (98 °F) (Temporal)   Ht 1.651 m (5' 5\")   Wt 73.2 kg (161 lb 6 oz)   SpO2 96%  Body mass index is 26.85 kg/m².    Physical Exam:     Physical " Exam  Constitutional:       Appearance: Normal appearance. He is well-developed and well-groomed.   HENT:      Head: Normocephalic and atraumatic.      Right Ear: External ear normal.      Left Ear: External ear normal.   Eyes:      General:         Right eye: No discharge.         Left eye: No discharge.      Conjunctiva/sclera: Conjunctivae normal.   Cardiovascular:      Rate and Rhythm: Normal rate.   Pulmonary:      Effort: Pulmonary effort is normal. No respiratory distress.   Musculoskeletal:      Cervical back: Neck supple.   Skin:     Findings: No rash.   Neurological:      Mental Status: He is alert.   Psychiatric:         Mood and Affect: Mood and affect normal.         Behavior: Behavior normal.        Labs:  I reviewed with patient recent labs resulted on 4/29/2023.    Assessment/Plan:         Problem List Items Addressed This Visit       Vitamin D deficiency     Chronic problem, unstable, recommended to take vitamin D 2000 international units daily.  Recheck labs with next blood work.         Relevant Orders    VITAMIN D,25 HYDROXY (DEFICIENCY)    Prediabetes     Chronic problem, unstable, continue to work on diet and exercise.  Continue metformin 500 mg 2 times daily.         Relevant Orders    HEMOGLOBIN A1C    Mixed hyperlipidemia     Chronic problem, unstable, counseled regarding dietary modification and exercise.  Recheck labs in 6 months.  Continue Lipitor 20 mg daily.  Recommended to start taking aspirin 81 mg daily         Relevant Orders    Comp Metabolic Panel    Lipid Profile     Other Visit Diagnoses       Screening for HIV (human immunodeficiency virus)        Relevant Orders    HIV AG/AB COMBO ASSAY SCREENING    Screening for prostate cancer        Relevant Orders    PROSTATE SPECIFIC AG SCREENING    Need for vaccination      Hep B first dose given today, second dose in 1 to 2-month, third dose in 6 months.    Relevant Orders    Hep B Adult 20+ (Completed)             Please note that this  dictation was created using voice recognition software. I have made every reasonable attempt to correct obvious errors, but I expect that there are errors of grammar and possibly content that I did not discover before finalizing the note.    Follow up in 6 months for annual physical and lab follow-up.

## 2023-11-14 NOTE — PROGRESS NOTES
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 moisturizer.  The most commonly reported side effects including irritation, redness, scaling, dryness, stinging, burning, itching, and increased risk of sunburn.  The patient verbalized understanding of the proper use and possible adverse effects of retinoids.  All of the patient's questions and concerns were addressed. Oxybutynin Counseling:  I discussed with the patient the risks of oxybutynin including but not limited to skin rash, drowsiness, dry mouth, difficulty urinating, and blurred vision. Litfulo Pregnancy And Lactation Text: Based on animal studies, Lifulo may cause embryo-fetal harm when administered to pregnant women.  The medication should not be used in pregnancy.  Breastfeeding is not recommended during treatment. Humira Counseling:  I discussed with the patient the risks of adalimumab including but not limited to myelosuppression, immunosuppression, autoimmune hepatitis, demyelinating diseases, lymphoma, and serious infections.  The patient understands that monitoring is required including a PPD at baseline and must alert us or the primary physician if symptoms of infection or other concerning signs are noted. Winlevi Counseling:  I discussed with the patient the risks of topical clascoterone including but not limited to erythema, scaling, itching, and stinging. Patient voiced their understanding. Cantharidin Pregnancy And Lactation Text: This medication has not been proven safe during pregnancy. It is unknown if this medication is excreted in breast milk. Adbry Pregnancy And Lactation Text: It is unknown if this medication will adversely affect pregnancy or breast feeding. Methotrexate Pregnancy And Lactation Text: This medication is Pregnancy Category X and is known to cause fetal harm. This medication is excreted in breast milk. Niacinamide Pregnancy And Lactation Text: These medications are considered safe during pregnancy. Opioid Pregnancy And Lactation Text: These medications can lead to premature delivery and should be avoided during pregnancy. These medications are also present in breast milk in small amounts. Acitretin Counseling:  I discussed with the patient the risks of acitretin including but not limited to hair loss, dry lips/skin/eyes, liver damage, hyperlipidemia, depression/suicidal ideation, photosensitivity.  Serious rare side effects can include but are not limited to pancreatitis, pseudotumor cerebri, bony changes, clot formation/stroke/heart attack.  Patient understands that alcohol is contraindicated since it can result in liver toxicity and significantly prolong the elimination of the drug by many years. Opzelura Counseling:  I discussed with the patient the risks of Opzelura including but not limited to nasopharngitis, bronchitis, ear infection, eosinophila, hives, diarrhea, folliculitis, tonsillitis, and rhinorrhea.  Taken orally, this medication has been linked to serious infections; higher rate of mortality; malignancy and lymphoproliferative disorders; major adverse cardiovascular events; thrombosis; thrombocytopenia, anemia, and neutropenia; and lipid elevations. Griseofulvin Counseling:  I discussed with the patient the risks of griseofulvin including but not limited to photosensitivity, cytopenia, liver damage, nausea/vomiting and severe allergy.  The patient understands that this medication is best absorbed when taken with a fatty meal (e.g., ice cream or french fries). Doxycycline Counseling:  Patient counseled regarding possible photosensitivity and increased risk for sunburn.  Patient instructed to avoid sunlight, if possible.  When exposed to sunlight, patients should wear protective clothing, sunglasses, and sunscreen.  The patient was instructed to call the office immediately if the following severe adverse effects occur:  hearing changes, easy bruising/bleeding, severe headache, or vision changes.  The patient verbalized understanding of the proper use and possible adverse effects of doxycycline.  All of the patient's questions and concerns were addressed. 5-Fu Counseling: 5-Fluorouracil Counseling:  I discussed with the patient the risks of 5-fluorouracil including but not limited to erythema, scaling, itching, weeping, crusting, and pain. Azathioprine Counseling:  I discussed with the patient the risks of azathioprine including but not limited to myelosuppression, immunosuppression, hepatotoxicity, lymphoma, and infections.  The patient understands that monitoring is required including baseline LFTs, Creatinine, possible TPMP genotyping and weekly CBCs for the first month and then every 2 weeks thereafter.  The patient verbalized understanding of the proper use and possible adverse effects of azathioprine.  All of the patient's questions and concerns were addressed. Gabapentin Counseling: I discussed with the patient the risks of gabapentin including but not limited to dizziness, somnolence, fatigue and ataxia. Soolantra Counseling: I discussed with the patients the risks of topial Soolantra. This is a medicine which decreases the number of mites and inflammation in the skin. You experience burning, stinging, eye irritation or allergic reactions.  Please call our office if you develop any problems from using this medication. Solaraze Counseling:  I discussed with the patient the risks of Solaraze including but not limited to erythema, scaling, itching, weeping, crusting, and pain. Olumiant Counseling: I discussed with the patient the risks of Olumiant therapy including but not limited to upper respiratory tract infections, shingles, cold sores, and nausea. Live vaccines should be avoided.  This medication has been linked to serious infections; higher rate of mortality; malignancy and lymphoproliferative disorders; major adverse cardiovascular events; thrombosis; gastrointestinal perforations; neutropenia; lymphopenia; anemia; liver enzyme elevations; and lipid elevations. Simponi Counseling:  I discussed with the patient the risks of golimumab including but not limited to myelosuppression, immunosuppression, autoimmune hepatitis, demyelinating diseases, lymphoma, and serious infections.  The patient understands that monitoring is required including a PPD at baseline and must alert us or the primary physician if symptoms of infection or other concerning signs are noted. Winlevi Pregnancy And Lactation Text: This medication is considered safe during pregnancy and breastfeeding. Aklief Pregnancy And Lactation Text: It is unknown if this medication is safe to use during pregnancy.  It is unknown if this medication is excreted in breast milk.  Breastfeeding women should use the topical cream on the smallest area of the skin for the shortest time needed while breastfeeding.  Do not apply to nipple and areola. Acitretin Pregnancy And Lactation Text: This medication is Pregnancy Category X and should not be given to women who are pregnant or may become pregnant in the future. This medication is excreted in breast milk. Opzelura Pregnancy And Lactation Text: There is insufficient data to evaluate drug-associated risk for major birth defects, miscarriage, or other adverse maternal or fetal outcomes.  There is a pregnancy registry that monitors pregnancy outcomes in pregnant persons exposed to the medication during pregnancy.  It is unknown if this medication is excreted in breast milk.  Do not breastfeed during treatment and for about 4 weeks after the last dose. Cimzia Counseling:  I discussed with the patient the risks of Cimzia including but not limited to immunosuppression, allergic reactions and infections.  The patient understands that monitoring is required including a PPD at baseline and must alert us or the primary physician if symptoms of infection or other concerning signs are noted. Azathioprine Pregnancy And Lactation Text: This medication is Pregnancy Category D and isn't considered safe during pregnancy. It is unknown if this medication is excreted in breast milk. Dutasteride Counseling: Dustasteride Counseling:  I discussed with the patient the risks of use of dutasteride including but not limited to decreased libido, decreased ejaculate volume, and gynecomastia. Women who can become pregnant should not handle medication.  All of the patient's questions and concerns were addressed. Nsaids Counseling: NSAID Counseling: I discussed with the patient that NSAIDs should be taken with food. Prolonged use of NSAIDs can result in the development of stomach ulcers.  Patient advised to stop taking NSAIDs if abdominal pain occurs.  The patient verbalized understanding of the proper use and possible adverse effects of NSAIDs.  All of the patient's questions and concerns were addressed. Topical Metronidazole Counseling: Metronidazole is a topical antibiotic medication. You may experience burning, stinging, redness, or allergic reactions.  Please call our office if you develop any problems from using this medication. Rifampin Counseling: I discussed with the patient the risks of rifampin including but not limited to liver damage, kidney damage, red-orange body fluids, nausea/vomiting and severe allergy. Imiquimod Counseling:  I discussed with the patient the risks of imiquimod including but not limited to erythema, scaling, itching, weeping, crusting, and pain.  Patient understands that the inflammatory response to imiquimod is variable from person to person and was educated regarded proper titration schedule.  If flu-like symptoms develop, patient knows to discontinue the medication and contact us. Xolair Counseling:  Patient informed of potential adverse effects including but not limited to fever, muscle aches, rash and allergic reactions.  The patient verbalized understanding of the proper use and possible adverse effects of Xolair.  All of the patient's questions and concerns were addressed. Hydroxyzine Counseling: Patient advised that the medication is sedating and not to drive a car after taking this medication.  Patient informed of potential adverse effects including but not limited to dry mouth, urinary retention, and blurry vision.  The patient verbalized understanding of the proper use and possible adverse effects of hydroxyzine.  All of the patient's questions and concerns were addressed. Soolantra Pregnancy And Lactation Text: This medication is Pregnancy Category C. This medication is considered safe during breast feeding. Doxycycline Pregnancy And Lactation Text: This medication is Pregnancy Category D and not consider safe during pregnancy. It is also excreted in breast milk but is considered safe for shorter treatment courses. Azithromycin Counseling:  I discussed with the patient the risks of azithromycin including but not limited to GI upset, allergic reaction, drug rash, diarrhea, and yeast infections. Doxepin Counseling:  Patient advised that the medication is sedating and not to drive a car after taking this medication. Patient informed of potential adverse effects including but not limited to dry mouth, urinary retention, and blurry vision.  The patient verbalized understanding of the proper use and possible adverse effects of doxepin.  All of the patient's questions and concerns were addressed. Arava Counseling:  Patient counseled regarding adverse effects of Arava including but not limited to nausea, vomiting, abnormalities in liver function tests. Patients may develop mouth sores, rash, diarrhea, and abnormalities in blood counts. The patient understands that monitoring is required including LFTs and blood counts.  There is a rare possibility of scarring of the liver and lung problems that can occur when taking methotrexate. Persistent nausea, loss of appetite, pale stools, dark urine, cough, and shortness of breath should be reported immediately. Patient advised to discontinue Arava treatment and consult with a physician prior to attempting conception. The patient will have to undergo a treatment to eliminate Arava from the body prior to conception. Griseofulvin Pregnancy And Lactation Text: This medication is Pregnancy Category X and is known to cause serious birth defects. It is unknown if this medication is excreted in breast milk but breast feeding should be avoided. VTAMA Counseling: I discussed with the patient that VTAMA is not for use in the eyes, mouth or mouth. They should call the office if they develop any signs of allergic reactions to VTAMA. The patient verbalized understanding of the proper use and possible adverse effects of VTAMA.  All of the patient's questions and concerns were addressed. Ilumya Counseling: I discussed with the patient the risks of tildrakizumab including but not limited to immunosuppression, malignancy, posterior leukoencephalopathy syndrome, and serious infections.  The patient understands that monitoring is required including a PPD at baseline and must alert us or the primary physician if symptoms of infection or other concerning signs are noted. Olumiant Pregnancy And Lactation Text: Based on animal studies, Olumiant may cause embryo-fetal harm when administered to pregnant women.  The medication should not be used in pregnancy.  Breastfeeding is not recommended during treatment. Propranolol Counseling:  I discussed with the patient the risks of propranolol including but not limited to low heart rate, low blood pressure, low blood sugar, restlessness and increased cold sensitivity. They should call the office if they experience any of these side effects. Azelaic Acid Counseling: Patient counseled that medicine may cause skin irritation and to avoid applying near the eyes.  In the event of skin irritation, the patient was advised to reduce the amount of the drug applied or use it less frequently.   The patient verbalized understanding of the proper use and possible adverse effects of azelaic acid.  All of the patient's questions and concerns were addressed. Solaraze Pregnancy And Lactation Text: This medication is Pregnancy Category B and is considered safe. There is some data to suggest avoiding during the third trimester. It is unknown if this medication is excreted in breast milk. Picato Counseling:  I discussed with the patient the risks of Picato including but not limited to erythema, scaling, itching, weeping, crusting, and pain. Bexarotene Counseling:  I discussed with the patient the risks of bexarotene including but not limited to hair loss, dry lips/skin/eyes, liver abnormalities, hyperlipidemia, pancreatitis, depression/suicidal ideation, photosensitivity, drug rash/allergic reactions, hypothyroidism, anemia, leukopenia, infection, cataracts, and teratogenicity.  Patient understands that they will need regular blood tests to check lipid profile, liver function tests, white blood cell count, thyroid function tests and pregnancy test if applicable. Rifampin Pregnancy And Lactation Text: This medication is Pregnancy Category C and it isn't know if it is safe during pregnancy. It is also excreted in breast milk and should not be used if you are breast feeding. Topical Metronidazole Pregnancy And Lactation Text: This medication is Pregnancy Category B and considered safe during pregnancy.  It is also considered safe to use while breastfeeding. Tranexamic Acid Pregnancy And Lactation Text: It is unknown if this medication is safe during pregnancy or breast feeding. Dutasteride Pregnancy And Lactation Text: This medication is absolutely contraindicated in women, especially during pregnancy and breast feeding. Feminization of male fetuses is possible if taking while pregnant. Nsaids Pregnancy And Lactation Text: These medications are considered safe up to 30 weeks gestation. It is excreted in breast milk. Cimzia Pregnancy And Lactation Text: This medication crosses the placenta but can be considered safe in certain situations. Cimzia may be excreted in breast milk. Erythromycin Counseling:  I discussed with the patient the risks of erythromycin including but not limited to GI upset, allergic reaction, drug rash, diarrhea, increase in liver enzymes, and yeast infections. Cellcept Counseling:  I discussed with the patient the risks of mycophenolate mofetil including but not limited to infection/immunosuppression, GI upset, hypokalemia, hypercholesterolemia, bone marrow suppression, lymphoproliferative disorders, malignancy, GI ulceration/bleed/perforation, colitis, interstitial lung disease, kidney failure, progressive multifocal leukoencephalopathy, and birth defects.  The patient understands that monitoring is required including a baseline creatinine and regular CBC testing. In addition, patient must alert us immediately if symptoms of infection or other concerning signs are noted. Hydroxyzine Pregnancy And Lactation Text: This medication is not safe during pregnancy and should not be taken. It is also excreted in breast milk and breast feeding isn't recommended. Topical Retinoid counseling:  Patient advised to apply a pea-sized amount only at bedtime and wait 30 minutes after washing their face before applying.  If too drying, patient may add a non-comedogenic moisturizer. The patient verbalized understanding of the proper use and possible adverse effects of retinoids.  All of the patient's questions and concerns were addressed. Itraconazole Counseling:  I discussed with the patient the risks of itraconazole including but not limited to liver damage, nausea/vomiting, neuropathy, and severe allergy.  The patient understands that this medication is best absorbed when taken with acidic beverages such as non-diet cola or ginger ale.  The patient understands that monitoring is required including baseline LFTs and repeat LFTs at intervals.  The patient understands that they are to contact us or the primary physician if concerning signs are noted. Xolair Pregnancy And Lactation Text: This medication is Pregnancy Category B and is considered safe during pregnancy. This medication is excreted in breast milk. Glycopyrrolate Counseling:  I discussed with the patient the risks of glycopyrrolate including but not limited to skin rash, drowsiness, dry mouth, difficulty urinating, and blurred vision. Skyrizi Counseling: I discussed with the patient the risks of risankizumab-rzaa including but not limited to immunosuppression, and serious infections.  The patient understands that monitoring is required including a PPD at baseline and must alert us or the primary physician if symptoms of infection or other concerning signs are noted. Rinvoq Counseling: I discussed with the patient the risks of Rinvoq therapy including but not limited to upper respiratory tract infections, shingles, cold sores, bronchitis, nausea, cough, fever, acne, and headache. Live vaccines should be avoided.  This medication has been linked to serious infections; higher rate of mortality; malignancy and lymphoproliferative disorders; major adverse cardiovascular events; thrombosis; thrombocytopenia, anemia, and neutropenia; lipid elevations; liver enzyme elevations; and gastrointestinal perforations. Drysol Counseling:  I discussed with the patient the risks of drysol/aluminum chloride including but not limited to skin rash, itching, irritation, burning. Azithromycin Pregnancy And Lactation Text: This medication is considered safe during pregnancy and is also secreted in breast milk. Azelaic Acid Pregnancy And Lactation Text: This medication is considered safe during pregnancy and breast feeding. Doxepin Pregnancy And Lactation Text: This medication is Pregnancy Category C and it isn't known if it is safe during pregnancy. It is also excreted in breast milk and breast feeding isn't recommended. Bexarotene Pregnancy And Lactation Text: This medication is Pregnancy Category X and should not be given to women who are pregnant or may become pregnant. This medication should not be used if you are breast feeding. Valtrex Counseling: I discussed with the patient the risks of valacyclovir including but not limited to kidney damage, nausea, vomiting and severe allergy.  The patient understands that if the infection seems to be worsening or is not improving, they are to call. Vtama Pregnancy And Lactation Text: It is unknown if this medication can cause problems during pregnancy and breastfeeding. Propranolol Pregnancy And Lactation Text: This medication is Pregnancy Category C and it isn't known if it is safe during pregnancy. It is excreted in breast milk. Finasteride Counseling:  I discussed with the patient the risks of use of finasteride including but not limited to decreased libido, decreased ejaculate volume, gynecomastia, and depression. Women should not handle medication.  All of the patient's questions and concerns were addressed. Olanzapine Counseling- I discussed with the patient the common side effects of olanzapine including but are not limited to: lack of energy, dry mouth, increased appetite, sleepiness, tremor, constipation, dizziness, changes in behavior, or restlessness.  Explained that teenagers are more likely to experience headaches, abdominal pain, pain in the arms or legs, tiredness, and sleepiness.  Serious side effects include but are not limited: increased risk of death in elderly patients who are confused, have memory loss, or dementia-related psychosis; hyperglycemia; increased cholesterol and triglycerides; and weight gain. Cosentyx Counseling:  I discussed with the patient the risks of Cosentyx including but not limited to worsening of Crohn's disease, immunosuppression, allergic reactions and infections.  The patient understands that monitoring is required including a PPD at baseline and must alert us or the primary physician if symptoms of infection or other concerning signs are noted. Topical Steroids Counseling: I discussed with the patient that prolonged use of topical steroids can result in the increased appearance of superficial blood vessels (telangiectasias), lightening (hypopigmentation) and thinning of the skin (atrophy).  Patient understands to avoid using high potency steroids in skin folds, the groin or the face.  The patient verbalized understanding of the proper use and possible adverse effects of topical steroids.  All of the patient's questions and concerns were addressed. Sarecycline Counseling: Patient advised regarding possible photosensitivity and discoloration of the teeth, skin, lips, tongue and gums.  Patient instructed to avoid sunlight, if possible.  When exposed to sunlight, patients should wear protective clothing, sunglasses, and sunscreen.  The patient was instructed to call the office immediately if the following severe adverse effects occur:  hearing changes, easy bruising/bleeding, severe headache, or vision changes.  The patient verbalized understanding of the proper use and possible adverse effects of sarecycline.  All of the patient's questions and concerns were addressed. Klisyri Counseling:  I discussed with the patient the risks of Klisyri including but not limited to erythema, scaling, itching, weeping, crusting, and pain. Glycopyrrolate Pregnancy And Lactation Text: This medication is Pregnancy Category B and is considered safe during pregnancy. It is unknown if it is excreted breast milk. Prednisone Counseling:  I discussed with the patient the risks of prolonged use of prednisone including but not limited to weight gain, insomnia, osteoporosis, mood changes, diabetes, susceptibility to infection, glaucoma and high blood pressure.  In cases where prednisone use is prolonged, patients should be monitored with blood pressure checks, serum glucose levels and an eye exam.  Additionally, the patient may need to be placed on GI prophylaxis, PCP prophylaxis, and calcium and vitamin D supplementation and/or a bisphosphonate.  The patient verbalized understanding of the proper use and the possible adverse effects of prednisone.  All of the patient's questions and concerns were addressed. Erythromycin Pregnancy And Lactation Text: This medication is Pregnancy Category B and is considered safe during pregnancy. It is also excreted in breast milk. Benzoyl Peroxide Counseling: Patient counseled that medicine may cause skin irritation and bleach clothing.  In the event of skin irritation, the patient was advised to reduce the amount of the drug applied or use it less frequently.   The patient verbalized understanding of the proper use and possible adverse effects of benzoyl peroxide.  All of the patient's questions and concerns were addressed. Albendazole Counseling:  I discussed with the patient the risks of albendazole including but not limited to cytopenia, kidney damage, nausea/vomiting and severe allergy.  The patient understands that this medication is being used in an off-label manner. Bactrim Counseling:  I discussed with the patient the risks of sulfa antibiotics including but not limited to GI upset, allergic reaction, drug rash, diarrhea, dizziness, photosensitivity, and yeast infections.  Rarely, more serious reactions can occur including but not limited to aplastic anemia, agranulocytosis, methemoglobinemia, blood dyscrasias, liver or kidney failure, lung infiltrates or desquamative/blistering drug rashes. Clofazimine Counseling:  I discussed with the patient the risks of clofazimine including but not limited to skin and eye pigmentation, liver damage, nausea/vomiting, gastrointestinal bleeding and allergy. Isotretinoin Counseling: Patient should get monthly blood tests, not donate blood, not drive at night if vision affected, not share medication, and not undergo elective surgery for 6 months after tx completed. Side effects reviewed, pt to contact office should one occur. Protopic Counseling: Patient may experience a mild burning sensation during topical application. Protopic is not approved in children less than 2 years of age. There have been case reports of hematologic and skin malignancies in patients using topical calcineurin inhibitors although causality is questionable. SSKI Counseling:  I discussed with the patient the risks of SSKI including but not limited to thyroid abnormalities, metallic taste, GI upset, fever, headache, acne, arthralgias, paraesthesias, lymphadenopathy, easy bleeding, arrhythmias, and allergic reaction. Erivedge Counseling- I discussed with the patient the risks of Erivedge including but not limited to nausea, vomiting, diarrhea, constipation, weight loss, changes in the sense of taste, decreased appetite, muscle spasms, and hair loss.  The patient verbalized understanding of the proper use and possible adverse effects of Erivedge.  All of the patient's questions and concerns were addressed. Infliximab Counseling:  I discussed with the patient the risks of infliximab including but not limited to myelosuppression, immunosuppression, autoimmune hepatitis, demyelinating diseases, lymphoma, and serious infections.  The patient understands that monitoring is required including a PPD at baseline and must alert us or the primary physician if symptoms of infection or other concerning signs are noted. Zoryve Counseling:  I discussed with the patient that Zoryve is not for use in the eyes, mouth or vagina. The most commonly reported side effects include diarrhea, headache, insomnia, application site pain, upper respiratory tract infections, and urinary tract infections.  All of the patient's questions and concerns were addressed. Rinvoq Pregnancy And Lactation Text: Based on animal studies, Rinvoq may cause embryo-fetal harm when administered to pregnant women.  The medication should not be used in pregnancy.  Breastfeeding is not recommended during treatment and for 6 days after the last dose. Valtrex Pregnancy And Lactation Text: this medication is Pregnancy Category B and is considered safe during pregnancy. This medication is not directly found in breast milk but it's metabolite acyclovir is present. Topical Steroids Applications Pregnancy And Lactation Text: Most topical steroids are considered safe to use during pregnancy and lactation.  Any topical steroid applied to the breast or nipple should be washed off before breastfeeding. Finasteride Pregnancy And Lactation Text: This medication is absolutely contraindicated during pregnancy. It is unknown if it is excreted in breast milk. Olanzapine Pregnancy And Lactation Text: This medication is pregnancy category C.   There are no adequate and well controlled trials with olanzapine in pregnant females.  Olanzapine should be used during pregnancy only if the potential benefit justifies the potential risk to the fetus.   In a study in lactating healthy women, olanzapine was excreted in breast milk.  It is recommended that women taking olanzapine should not breast feed. Ketoconazole Counseling:   Patient counseled regarding improving absorption with orange juice.  Adverse effects include but are not limited to breast enlargement, headache, diarrhea, nausea, upset stomach, liver function test abnormalities, taste disturbance, and stomach pain.  There is a rare possibility of liver failure that can occur when taking ketoconazole. The patient understands that monitoring of LFTs may be required, especially at baseline. The patient verbalized understanding of the proper use and possible adverse effects of ketoconazole.  All of the patient's questions and concerns were addressed. Hydroxychloroquine Counseling:  I discussed with the patient that a baseline ophthalmologic exam is needed at the start of therapy and every year thereafter while on therapy. A CBC may also be warranted for monitoring.  The side effects of this medication were discussed with the patient, including but not limited to agranulocytosis, aplastic anemia, seizures, rashes, retinopathy, and liver toxicity. Patient instructed to call the office should any adverse effect occur.  The patient verbalized understanding of the proper use and possible adverse effects of Plaquenil.  All the patient's questions and concerns were addressed. Tazorac Counseling:  Patient advised that medication is irritating and drying.  Patient may need to apply sparingly and wash off after an hour before eventually leaving it on overnight.  The patient verbalized understanding of the proper use and possible adverse effects of tazorac.  All of the patient's questions and concerns were addressed. Klisyri Pregnancy And Lactation Text: It is unknown if this medication can harm a developing fetus or if it is excreted in breast milk. Metronidazole Counseling:  I discussed with the patient the risks of metronidazole including but not limited to seizures, nausea/vomiting, a metallic taste in the mouth, nausea/vomiting and severe allergy. Elidel Counseling: Patient may experience a mild burning sensation during topical application. Elidel is not approved in children less than 2 years of age. There have been case reports of hematologic and skin malignancies in patients using topical calcineurin inhibitors although causality is questionable. Cyclophosphamide Counseling:  I discussed with the patient the risks of cyclophosphamide including but not limited to hair loss, hormonal abnormalities, decreased fertility, abdominal pain, diarrhea, nausea and vomiting, bone marrow suppression and infection. The patient understands that monitoring is required while taking this medication. Benzoyl Peroxide Pregnancy And Lactation Text: This medication is Pregnancy Category C. It is unknown if benzoyl peroxide is excreted in breast milk. Bactrim Pregnancy And Lactation Text: This medication is Pregnancy Category D and is known to cause fetal risk.  It is also excreted in breast milk. Sotyktu Counseling:  I discussed the most common side effects of Sotyktu including: common cold, sore throat, sinus infections, cold sores, canker sores, folliculitis, and acne.  I also discussed more serious side effects of Sotyktu including but not limited to: serious allergic reactions; increased risk for infections such as TB; cancers such as lymphomas; rhabdomyolysis and elevated CPK; and elevated triglycerides and liver enzymes.  Stelara Counseling:  I discussed with the patient the risks of ustekinumab including but not limited to immunosuppression, malignancy, posterior leukoencephalopathy syndrome, and serious infections.  The patient understands that monitoring is required including a PPD at baseline and must alert us or the primary physician if symptoms of infection or other concerning signs are noted. Sski Pregnancy And Lactation Text: This medication is Pregnancy Category D and isn't considered safe during pregnancy. It is excreted in breast milk. Protopic Pregnancy And Lactation Text: This medication is Pregnancy Category C. It is unknown if this medication is excreted in breast milk when applied topically. Oral Minoxidil Counseling- I discussed with the patient the risks of oral minoxidil including but not limited to shortness of breath, swelling of the feet or ankles, dizziness, lightheadedness, unwanted hair growth and allergic reaction.  The patient verbalized understanding of the proper use and possible adverse effects of oral minoxidil.  All of the patient's questions and concerns were addressed. Isotretinoin Pregnancy And Lactation Text: This medication is Pregnancy Category X and is considered extremely dangerous during pregnancy. It is unknown if it is excreted in breast milk. Birth Control Pills Counseling: Birth Control Pill Counseling: I discussed with the patient the potential side effects of OCPs including but not limited to increased risk of stroke, heart attack, thrombophlebitis, deep venous thrombosis, hepatic adenomas, breast changes, GI upset, headaches, and depression.  The patient verbalized understanding of the proper use and possible adverse effects of OCPs. All of the patient's questions and concerns were addressed. Dupixent Counseling: I discussed with the patient the risks of dupilumab including but not limited to eye infection and irritation, cold sores, injection site reactions, worsening of asthma, allergic reactions and increased risk of parasitic infection.  Live vaccines should be avoided while taking dupilumab. Dupilumab will also interact with certain medications such as warfarin and cyclosporine. The patient understands that monitoring is required and they must alert us or the primary physician if symptoms of infection or other concerning signs are noted. Topical Sulfur Applications Counseling: Topical Sulfur Counseling: Patient counseled that this medication may cause skin irritation or allergic reactions.  In the event of skin irritation, the patient was advised to reduce the amount of the drug applied or use it less frequently.   The patient verbalized understanding of the proper use and possible adverse effects of topical sulfur application.  All of the patient's questions and concerns were addressed. Cyclophosphamide Pregnancy And Lactation Text: This medication is Pregnancy Category D and it isn't considered safe during pregnancy. This medication is excreted in breast milk. Ketoconazole Pregnancy And Lactation Text: This medication is Pregnancy Category C and it isn't know if it is safe during pregnancy. It is also excreted in breast milk and breast feeding isn't recommended. Tetracycline Counseling: Patient counseled regarding possible photosensitivity and increased risk for sunburn.  Patient instructed to avoid sunlight, if possible.  When exposed to sunlight, patients should wear protective clothing, sunglasses, and sunscreen.  The patient was instructed to call the office immediately if the following severe adverse effects occur:  hearing changes, easy bruising/bleeding, severe headache, or vision changes.  The patient verbalized understanding of the proper use and possible adverse effects of tetracycline.  All of the patient's questions and concerns were addressed. Patient understands to avoid pregnancy while on therapy due to potential birth defects. Minoxidil Counseling: Minoxidil is a topical medication which can increase blood flow where it is applied. It is uncertain how this medication increases hair growth. Side effects are uncommon and include stinging and allergic reactions. Tazorac Pregnancy And Lactation Text: This medication is not safe during pregnancy. It is unknown if this medication is excreted in breast milk. Metronidazole Pregnancy And Lactation Text: This medication is Pregnancy Category B and considered safe during pregnancy.  It is also excreted in breast milk. Hydroxychloroquine Pregnancy And Lactation Text: This medication has been shown to cause fetal harm but it isn't assigned a Pregnancy Risk Category. There are small amounts excreted in breast milk. Cephalexin Counseling: I counseled the patient regarding use of cephalexin as an antibiotic for prophylactic and/or therapeutic purposes. Cephalexin (commonly prescribed under brand name Keflex) is a cephalosporin antibiotic which is active against numerous classes of bacteria, including most skin bacteria. Side effects may include nausea, diarrhea, gastrointestinal upset, rash, hives, yeast infections, and in rare cases, hepatitis, kidney disease, seizures, fever, confusion, neurologic symptoms, and others. Patients with severe allergies to penicillin medications are cautioned that there is about a 10% incidence of cross-reactivity with cephalosporins. When possible, patients with penicillin allergies should use alternatives to cephalosporins for antibiotic therapy. Colchicine Counseling:  Patient counseled regarding adverse effects including but not limited to stomach upset (nausea, vomiting, stomach pain, or diarrhea).  Patient instructed to limit alcohol consumption while taking this medication.  Colchicine may reduce blood counts especially with prolonged use.  The patient understands that monitoring of kidney function and blood counts may be required, especially at baseline. The patient verbalized understanding of the proper use and possible adverse effects of colchicine.  All of the patient's questions and concerns were addressed. Sotyktu Pregnancy And Lactation Text: There is insufficient data to evaluate whether or not Sotyktu is safe to use during pregnancy.   It is not known if Sotyktu passes into breast milk and whether or not it is safe to use when breastfeeding.

## 2023-11-22 ENCOUNTER — APPOINTMENT (OUTPATIENT)
Dept: MEDICAL GROUP | Facility: MEDICAL CENTER | Age: 58
End: 2023-11-22

## 2025-04-08 ENCOUNTER — HOSPITAL ENCOUNTER (OUTPATIENT)
Facility: MEDICAL CENTER | Age: 60
End: 2025-04-08
Attending: FAMILY MEDICINE
Payer: COMMERCIAL

## 2025-04-08 ENCOUNTER — OFFICE VISIT (OUTPATIENT)
Dept: MEDICAL GROUP | Facility: MEDICAL CENTER | Age: 60
End: 2025-04-08
Payer: COMMERCIAL

## 2025-04-08 ENCOUNTER — RESULTS FOLLOW-UP (OUTPATIENT)
Dept: MEDICAL GROUP | Facility: MEDICAL CENTER | Age: 60
End: 2025-04-08

## 2025-04-08 VITALS
BODY MASS INDEX: 25.53 KG/M2 | TEMPERATURE: 100.6 F | SYSTOLIC BLOOD PRESSURE: 108 MMHG | OXYGEN SATURATION: 97 % | HEART RATE: 89 BPM | WEIGHT: 153.22 LBS | HEIGHT: 65 IN | DIASTOLIC BLOOD PRESSURE: 66 MMHG

## 2025-04-08 DIAGNOSIS — R73.03 PREDIABETES: ICD-10-CM

## 2025-04-08 DIAGNOSIS — Z12.5 SCREENING FOR PROSTATE CANCER: ICD-10-CM

## 2025-04-08 DIAGNOSIS — E78.2 MIXED HYPERLIPIDEMIA: ICD-10-CM

## 2025-04-08 DIAGNOSIS — Z11.4 SCREENING FOR HIV (HUMAN IMMUNODEFICIENCY VIRUS): ICD-10-CM

## 2025-04-08 DIAGNOSIS — R50.9 FEVER, UNSPECIFIED FEVER CAUSE: ICD-10-CM

## 2025-04-08 DIAGNOSIS — R30.0 DYSURIA: ICD-10-CM

## 2025-04-08 DIAGNOSIS — E55.9 VITAMIN D DEFICIENCY: ICD-10-CM

## 2025-04-08 LAB
25(OH)D3 SERPL-MCNC: 23 NG/ML (ref 30–100)
ALBUMIN SERPL BCP-MCNC: 3.6 G/DL (ref 3.2–4.9)
ALBUMIN/GLOB SERPL: 0.9 G/DL
ALP SERPL-CCNC: 131 U/L (ref 30–99)
ALT SERPL-CCNC: 22 U/L (ref 2–50)
ANION GAP SERPL CALC-SCNC: 13 MMOL/L (ref 7–16)
APPEARANCE UR: ABNORMAL
AST SERPL-CCNC: 22 U/L (ref 12–45)
BASOPHILS # BLD AUTO: 0.3 % (ref 0–1.8)
BASOPHILS # BLD: 0.04 K/UL (ref 0–0.12)
BILIRUB SERPL-MCNC: 0.7 MG/DL (ref 0.1–1.5)
BILIRUB UR STRIP-MCNC: NEGATIVE MG/DL
BUN SERPL-MCNC: 10 MG/DL (ref 8–22)
CALCIUM ALBUM COR SERPL-MCNC: 8.9 MG/DL (ref 8.5–10.5)
CALCIUM SERPL-MCNC: 8.6 MG/DL (ref 8.5–10.5)
CHLORIDE SERPL-SCNC: 97 MMOL/L (ref 96–112)
CHOLEST SERPL-MCNC: 119 MG/DL (ref 100–199)
CO2 SERPL-SCNC: 21 MMOL/L (ref 20–33)
COLOR UR AUTO: YELLOW
CREAT SERPL-MCNC: 1.01 MG/DL (ref 0.5–1.4)
EOSINOPHIL # BLD AUTO: 0 K/UL (ref 0–0.51)
EOSINOPHIL NFR BLD: 0 % (ref 0–6.9)
ERYTHROCYTE [DISTWIDTH] IN BLOOD BY AUTOMATED COUNT: 44.9 FL (ref 35.9–50)
EST. AVERAGE GLUCOSE BLD GHB EST-MCNC: 131 MG/DL
GFR SERPLBLD CREATININE-BSD FMLA CKD-EPI: 85 ML/MIN/1.73 M 2
GLOBULIN SER CALC-MCNC: 3.9 G/DL (ref 1.9–3.5)
GLUCOSE SERPL-MCNC: 118 MG/DL (ref 65–99)
GLUCOSE UR STRIP.AUTO-MCNC: NEGATIVE MG/DL
HBA1C MFR BLD: 6.2 % (ref 4–5.6)
HCT VFR BLD AUTO: 40.1 % (ref 42–52)
HDLC SERPL-MCNC: 26 MG/DL
HGB BLD-MCNC: 13.3 G/DL (ref 14–18)
IMM GRANULOCYTES # BLD AUTO: 0.05 K/UL (ref 0–0.11)
IMM GRANULOCYTES NFR BLD AUTO: 0.4 % (ref 0–0.9)
KETONES UR STRIP.AUTO-MCNC: NEGATIVE MG/DL
LDLC SERPL CALC-MCNC: 75 MG/DL
LEUKOCYTE ESTERASE UR QL STRIP.AUTO: ABNORMAL
LYMPHOCYTES # BLD AUTO: 2.27 K/UL (ref 1–4.8)
LYMPHOCYTES NFR BLD: 16.1 % (ref 22–41)
MCH RBC QN AUTO: 31.4 PG (ref 27–33)
MCHC RBC AUTO-ENTMCNC: 33.2 G/DL (ref 32.3–36.5)
MCV RBC AUTO: 94.6 FL (ref 81.4–97.8)
MONOCYTES # BLD AUTO: 1.44 K/UL (ref 0–0.85)
MONOCYTES NFR BLD AUTO: 10.2 % (ref 0–13.4)
NEUTROPHILS # BLD AUTO: 10.31 K/UL (ref 1.82–7.42)
NEUTROPHILS NFR BLD: 73 % (ref 44–72)
NITRITE UR QL STRIP.AUTO: POSITIVE
NRBC # BLD AUTO: 0 K/UL
NRBC BLD-RTO: 0 /100 WBC (ref 0–0.2)
PH UR STRIP.AUTO: 6 [PH] (ref 5–8)
PLATELET # BLD AUTO: 300 K/UL (ref 164–446)
PMV BLD AUTO: 8.9 FL (ref 9–12.9)
POTASSIUM SERPL-SCNC: 3.8 MMOL/L (ref 3.6–5.5)
PROT SERPL-MCNC: 7.5 G/DL (ref 6–8.2)
PROT UR QL STRIP: 30 MG/DL
PSA SERPL-MCNC: 11.1 NG/ML (ref 0–4)
RBC # BLD AUTO: 4.24 M/UL (ref 4.7–6.1)
RBC UR QL AUTO: ABNORMAL
SODIUM SERPL-SCNC: 131 MMOL/L (ref 135–145)
SP GR UR STRIP.AUTO: 1.01
TRIGL SERPL-MCNC: 89 MG/DL (ref 0–149)
UROBILINOGEN UR STRIP-MCNC: 1 MG/DL
WBC # BLD AUTO: 14.1 K/UL (ref 4.8–10.8)

## 2025-04-08 PROCEDURE — 83036 HEMOGLOBIN GLYCOSYLATED A1C: CPT

## 2025-04-08 PROCEDURE — 87040 BLOOD CULTURE FOR BACTERIA: CPT

## 2025-04-08 PROCEDURE — 36415 COLL VENOUS BLD VENIPUNCTURE: CPT

## 2025-04-08 PROCEDURE — 81002 URINALYSIS NONAUTO W/O SCOPE: CPT | Performed by: FAMILY MEDICINE

## 2025-04-08 PROCEDURE — 99214 OFFICE O/P EST MOD 30 MIN: CPT | Performed by: FAMILY MEDICINE

## 2025-04-08 PROCEDURE — 84153 ASSAY OF PSA TOTAL: CPT

## 2025-04-08 PROCEDURE — 3074F SYST BP LT 130 MM HG: CPT | Performed by: FAMILY MEDICINE

## 2025-04-08 PROCEDURE — 87086 URINE CULTURE/COLONY COUNT: CPT

## 2025-04-08 PROCEDURE — 3078F DIAST BP <80 MM HG: CPT | Performed by: FAMILY MEDICINE

## 2025-04-08 PROCEDURE — 80053 COMPREHEN METABOLIC PANEL: CPT

## 2025-04-08 PROCEDURE — 82306 VITAMIN D 25 HYDROXY: CPT

## 2025-04-08 PROCEDURE — 85025 COMPLETE CBC W/AUTO DIFF WBC: CPT

## 2025-04-08 PROCEDURE — 87186 SC STD MICRODIL/AGAR DIL: CPT

## 2025-04-08 PROCEDURE — 87077 CULTURE AEROBIC IDENTIFY: CPT

## 2025-04-08 PROCEDURE — 87389 HIV-1 AG W/HIV-1&-2 AB AG IA: CPT

## 2025-04-08 PROCEDURE — 80061 LIPID PANEL: CPT

## 2025-04-08 RX ORDER — SULFAMETHOXAZOLE AND TRIMETHOPRIM 800; 160 MG/1; MG/1
1 TABLET ORAL 2 TIMES DAILY
Qty: 10 TABLET | Refills: 0 | Status: SHIPPED | OUTPATIENT
Start: 2025-04-08 | End: 2025-04-13

## 2025-04-08 ASSESSMENT — ENCOUNTER SYMPTOMS
FEVER: 1
CHILLS: 1

## 2025-04-08 ASSESSMENT — PATIENT HEALTH QUESTIONNAIRE - PHQ9: CLINICAL INTERPRETATION OF PHQ2 SCORE: 0

## 2025-04-08 NOTE — PROGRESS NOTES
Verbal consent was acquired by the patient to use [employer] ambient listening note generation during this visit.      Alek was seen today for follow-up.    Diagnoses and all orders for this visit:    Fever, unspecified fever cause  -     Blood Culture; Future  -     URINE CULTURE(NEW); Future  -     POCT Urinalysis  -     sulfamethoxazole-trimethoprim (BACTRIM DS) 800-160 MG tablet; Take 1 Tablet by mouth 2 times a day for 5 days.    Prediabetes  -     Comp Metabolic Panel; Future  -     HEMOGLOBIN A1C; Future    Mixed hyperlipidemia  -     Lipid Profile; Future    Dysuria  -     CBC WITH DIFFERENTIAL; Future  -     Blood Culture; Future  -     URINE CULTURE(NEW); Future  -     POCT Urinalysis  -     sulfamethoxazole-trimethoprim (BACTRIM DS) 800-160 MG tablet; Take 1 Tablet by mouth 2 times a day for 5 days.    Screening for prostate cancer  -     PROSTATE SPECIFIC AG SCREENING; Future    Vitamin D deficiency  -     VITAMIN D,25 HYDROXY (DEFICIENCY); Future    Screening for HIV (human immunodeficiency virus)  -     HIV AG/AB COMBO ASSAY SCREENING; Future                  Assessment & Plan  1.  Urinary tract infection , fever  New condition, unstable  - Send for urine culture and check blood cultures also  - Start Bactrim 1 tablet 2 times daily for 5 days.  Discussed if he is not getting better by tomorrow then to go to ER as he has fever and flank pain.  Patient reports understanding of this.  - Work note given to be off from work    2 Prediabetes and hypercholesterolemia  These are chronic conditions, unstable as he stopped taking medications, recheck labs to assess this further.    3.  Vitamin D deficiency  Chronic condition, unstable, recheck labs.    Follow-up  - Patient to follow up next week for blood test follow-up          Chief complaint::Diagnoses of Fever, unspecified fever cause, Prediabetes, Mixed hyperlipidemia, Dysuria, Screening for prostate cancer, Vitamin D deficiency, and Screening for HIV  "(human immunodeficiency virus) were pertinent to this visit.      History of Present Illness  The patient is a 59-year-old male who presents for fever symptoms.    Lower Back Pain  - Began experiencing lower back pain last Friday  - Pain was particularly noticeable during urination    High Fever  - Developed a high fever on Saturday  - Accompanied by severe shaking  - Clothing was drenched in sweat    Headache  - Reports a headache  - Uncertain of the cause but suspects it may be related to the fever    He has not sought medical attention from any other healthcare provider since the onset of these symptoms. He reports no nasal congestion, cough, sore throat, or ear pain.      Review of Systems   Constitutional:  Positive for chills and fever.   Genitourinary:  Positive for dysuria, frequency and urgency.          Medications and Allergies:     Current Outpatient Medications   Medication Sig Dispense Refill    sulfamethoxazole-trimethoprim (BACTRIM DS) 800-160 MG tablet Take 1 Tablet by mouth 2 times a day for 5 days. 10 Tablet 0     No current facility-administered medications for this visit.       /66 (BP Location: Left arm, Patient Position: Sitting, BP Cuff Size: Adult)   Pulse 89   Temp (!) 38.1 °C (100.6 °F) (Temporal)   Ht 1.638 m (5' 4.5\")   Wt 69.5 kg (153 lb 3.5 oz)   SpO2 97% , Body mass index is 25.89 kg/m².      Physical Exam  Constitutional:       Appearance: Normal appearance. He is well-developed and well-groomed.   HENT:      Head: Normocephalic and atraumatic.      Right Ear: External ear normal.      Left Ear: External ear normal.   Eyes:      General:         Right eye: No discharge.         Left eye: No discharge.      Conjunctiva/sclera: Conjunctivae normal.   Cardiovascular:      Rate and Rhythm: Normal rate.   Pulmonary:      Effort: Pulmonary effort is normal. No respiratory distress.   Abdominal:      Tenderness: There is no right CVA tenderness or left CVA tenderness. "   Musculoskeletal:      Cervical back: Neck supple.   Skin:     Findings: No rash.   Neurological:      Mental Status: He is alert.   Psychiatric:         Mood and Affect: Mood and affect normal.         Behavior: Behavior normal.                Please note that this dictation was created using voice recognition software. I have made every reasonable attempt to correct obvious errors, but I expect that there are errors of grammar and possibly content that I did not discover before finalizing the note.

## 2025-04-08 NOTE — LETTER
April 8, 2025       Patient: Alek Carvalho   YOB: 1965   Date of Visit: 4/8/2025         To Whom It May Concern:    In my medical opinion, I recommend that Alek Carvalho remain out of work from 4/8/2025 to 4/10/2025. He can return to work on 4/11/2025 if he is feeling better.    If you have any questions or concerns, please don't hesitate to call [phone]          Sincerely,          Kuldeep Cadet M.D.  Electronically Signed

## 2025-04-09 ENCOUNTER — RESULTS FOLLOW-UP (OUTPATIENT)
Dept: MEDICAL GROUP | Facility: MEDICAL CENTER | Age: 60
End: 2025-04-09
Payer: COMMERCIAL

## 2025-04-09 DIAGNOSIS — R97.20 ELEVATED PSA: ICD-10-CM

## 2025-04-09 LAB — HIV 1+2 AB+HIV1 P24 AG SERPL QL IA: NORMAL

## 2025-04-11 ENCOUNTER — RESULTS FOLLOW-UP (OUTPATIENT)
Dept: MEDICAL GROUP | Facility: MEDICAL CENTER | Age: 60
End: 2025-04-11

## 2025-04-11 DIAGNOSIS — N39.0 URINARY TRACT INFECTION WITHOUT HEMATURIA, SITE UNSPECIFIED: ICD-10-CM

## 2025-04-11 LAB
BACTERIA UR CULT: ABNORMAL
BACTERIA UR CULT: ABNORMAL
SIGNIFICANT IND 70042: ABNORMAL
SITE SITE: ABNORMAL
SOURCE SOURCE: ABNORMAL

## 2025-04-11 RX ORDER — NITROFURANTOIN 25; 75 MG/1; MG/1
100 CAPSULE ORAL 2 TIMES DAILY
Qty: 10 CAPSULE | Refills: 0 | Status: SHIPPED | OUTPATIENT
Start: 2025-04-11 | End: 2025-04-16

## 2025-04-13 LAB
BACTERIA BLD CULT: NORMAL
SIGNIFICANT IND 70042: NORMAL
SITE SITE: NORMAL
SOURCE SOURCE: NORMAL

## 2025-04-15 NOTE — Clinical Note
REFERRAL APPROVAL NOTICE         Sent on April 15, 2025                   Alek Carvalho  [address] [place] [address]                   Dear Mr. Lexi Carvalho,    After a careful review of the medical information and benefit coverage, [facility] has processed your referral. See below for additional details.    If applicable, you must be actively enrolled with your insurance for coverage of the authorized service. If you have any questions regarding your coverage, please contact your insurance directly.    REFERRAL INFORMATION   Referral #:  [id number]  Referred-To Department    Referred-By Provider:  Urology    Kuldeep Cadet M.D.   [facility]      [address]  [address]  [place] [id number] [phone] [address] [facility] [address]  [place] [id number] [phone]    Referral Start Date:  04/09/2025  Referral End Date:   04/09/2026             SCHEDULING  If you do not already have an appointment, please call [phone] to make an appointment.     MORE INFORMATION  If you do not already have a [employer] account, sign up at: [email or web address]  You can access your medical information, make appointments, see lab results, billing information, and more.  If you have questions regarding this referral, please contact  the [facility] Referrals department at:             [phone]. Monday - Friday 8:00AM - 5:00PM.     Sincerely,    [facility]

## 2025-04-17 ENCOUNTER — APPOINTMENT (OUTPATIENT)
Dept: RADIOLOGY | Facility: MEDICAL CENTER | Age: 60
End: 2025-04-17
Attending: EMERGENCY MEDICINE
Payer: COMMERCIAL

## 2025-04-17 ENCOUNTER — PHARMACY VISIT (OUTPATIENT)
Dept: PHARMACY | Facility: MEDICAL CENTER | Age: 60
End: 2025-04-17
Payer: COMMERCIAL

## 2025-04-17 ENCOUNTER — HOSPITAL ENCOUNTER (EMERGENCY)
Facility: MEDICAL CENTER | Age: 60
End: 2025-04-17
Attending: EMERGENCY MEDICINE
Payer: COMMERCIAL

## 2025-04-17 ENCOUNTER — OFFICE VISIT (OUTPATIENT)
Dept: MEDICAL GROUP | Facility: MEDICAL CENTER | Age: 60
End: 2025-04-17
Payer: COMMERCIAL

## 2025-04-17 VITALS
SYSTOLIC BLOOD PRESSURE: 124 MMHG | RESPIRATION RATE: 38 BRPM | HEART RATE: 75 BPM | TEMPERATURE: 99.6 F | DIASTOLIC BLOOD PRESSURE: 77 MMHG | OXYGEN SATURATION: 95 %

## 2025-04-17 VITALS
DIASTOLIC BLOOD PRESSURE: 78 MMHG | TEMPERATURE: 99.3 F | OXYGEN SATURATION: 97 % | HEIGHT: 64 IN | SYSTOLIC BLOOD PRESSURE: 122 MMHG | HEART RATE: 84 BPM | BODY MASS INDEX: 25.97 KG/M2 | WEIGHT: 152.12 LBS

## 2025-04-17 DIAGNOSIS — Z16.12 ESBL (EXTENDED SPECTRUM BETA-LACTAMASE) PRODUCING BACTERIA INFECTION: ICD-10-CM

## 2025-04-17 DIAGNOSIS — N39.0 E. COLI UTI: ICD-10-CM

## 2025-04-17 DIAGNOSIS — B96.20 E. COLI UTI: ICD-10-CM

## 2025-04-17 DIAGNOSIS — A49.9 ESBL (EXTENDED SPECTRUM BETA-LACTAMASE) PRODUCING BACTERIA INFECTION: ICD-10-CM

## 2025-04-17 DIAGNOSIS — N30.00 ACUTE CYSTITIS WITHOUT HEMATURIA: ICD-10-CM

## 2025-04-17 LAB
ALBUMIN SERPL BCP-MCNC: 3.8 G/DL (ref 3.2–4.9)
ALBUMIN/GLOB SERPL: 0.9 G/DL
ALP SERPL-CCNC: 164 U/L (ref 30–99)
ALT SERPL-CCNC: 28 U/L (ref 2–50)
ANION GAP SERPL CALC-SCNC: 13 MMOL/L (ref 7–16)
APPEARANCE UR: CLEAR
AST SERPL-CCNC: 29 U/L (ref 12–45)
BACTERIA #/AREA URNS HPF: ABNORMAL /HPF
BASOPHILS # BLD AUTO: 0.2 % (ref 0–1.8)
BASOPHILS # BLD: 0.03 K/UL (ref 0–0.12)
BILIRUB SERPL-MCNC: 0.4 MG/DL (ref 0.1–1.5)
BILIRUB UR QL STRIP.AUTO: NEGATIVE
BUN SERPL-MCNC: 15 MG/DL (ref 8–22)
CALCIUM ALBUM COR SERPL-MCNC: 9.2 MG/DL (ref 8.5–10.5)
CALCIUM SERPL-MCNC: 9 MG/DL (ref 8.5–10.5)
CASTS URNS QL MICRO: ABNORMAL /LPF (ref 0–2)
CHLORIDE SERPL-SCNC: 99 MMOL/L (ref 96–112)
CO2 SERPL-SCNC: 23 MMOL/L (ref 20–33)
COLOR UR: YELLOW
CREAT SERPL-MCNC: 1.07 MG/DL (ref 0.5–1.4)
EOSINOPHIL # BLD AUTO: 0.02 K/UL (ref 0–0.51)
EOSINOPHIL NFR BLD: 0.2 % (ref 0–6.9)
EPITHELIAL CELLS 1715: ABNORMAL /HPF (ref 0–5)
ERYTHROCYTE [DISTWIDTH] IN BLOOD BY AUTOMATED COUNT: 46.7 FL (ref 35.9–50)
GFR SERPLBLD CREATININE-BSD FMLA CKD-EPI: 80 ML/MIN/1.73 M 2
GLOBULIN SER CALC-MCNC: 4.2 G/DL (ref 1.9–3.5)
GLUCOSE SERPL-MCNC: 117 MG/DL (ref 65–99)
GLUCOSE UR STRIP.AUTO-MCNC: NEGATIVE MG/DL
HCT VFR BLD AUTO: 38.6 % (ref 42–52)
HGB BLD-MCNC: 12.8 G/DL (ref 14–18)
IMM GRANULOCYTES # BLD AUTO: 0.07 K/UL (ref 0–0.11)
IMM GRANULOCYTES NFR BLD AUTO: 0.5 % (ref 0–0.9)
KETONES UR STRIP.AUTO-MCNC: NEGATIVE MG/DL
LACTATE SERPL-SCNC: 1 MMOL/L (ref 0.5–2)
LEUKOCYTE ESTERASE UR QL STRIP.AUTO: ABNORMAL
LYMPHOCYTES # BLD AUTO: 2.06 K/UL (ref 1–4.8)
LYMPHOCYTES NFR BLD: 15.8 % (ref 22–41)
MCH RBC QN AUTO: 31.7 PG (ref 27–33)
MCHC RBC AUTO-ENTMCNC: 33.2 G/DL (ref 32.3–36.5)
MCV RBC AUTO: 95.5 FL (ref 81.4–97.8)
MICRO URNS: ABNORMAL
MONOCYTES # BLD AUTO: 0.92 K/UL (ref 0–0.85)
MONOCYTES NFR BLD AUTO: 7.1 % (ref 0–13.4)
NEUTROPHILS # BLD AUTO: 9.93 K/UL (ref 1.82–7.42)
NEUTROPHILS NFR BLD: 76.2 % (ref 44–72)
NITRITE UR QL STRIP.AUTO: NEGATIVE
NRBC # BLD AUTO: 0 K/UL
NRBC BLD-RTO: 0 /100 WBC (ref 0–0.2)
PH UR STRIP.AUTO: 6.5 [PH] (ref 5–8)
PLATELET # BLD AUTO: 467 K/UL (ref 164–446)
PMV BLD AUTO: 8.8 FL (ref 9–12.9)
POTASSIUM SERPL-SCNC: 4.1 MMOL/L (ref 3.6–5.5)
PROT SERPL-MCNC: 8 G/DL (ref 6–8.2)
PROT UR QL STRIP: NEGATIVE MG/DL
RBC # BLD AUTO: 4.04 M/UL (ref 4.7–6.1)
RBC # URNS HPF: ABNORMAL /HPF
RBC UR QL AUTO: ABNORMAL
SODIUM SERPL-SCNC: 135 MMOL/L (ref 135–145)
SP GR UR STRIP.AUTO: 1.01
UROBILINOGEN UR STRIP.AUTO-MCNC: 1 EU/DL
WBC # BLD AUTO: 13 K/UL (ref 4.8–10.8)
WBC #/AREA URNS HPF: >100 /HPF

## 2025-04-17 PROCEDURE — 85025 COMPLETE CBC W/AUTO DIFF WBC: CPT

## 2025-04-17 PROCEDURE — 36415 COLL VENOUS BLD VENIPUNCTURE: CPT

## 2025-04-17 PROCEDURE — RXMED WILLOW AMBULATORY MEDICATION CHARGE: Performed by: EMERGENCY MEDICINE

## 2025-04-17 PROCEDURE — 96365 THER/PROPH/DIAG IV INF INIT: CPT

## 2025-04-17 PROCEDURE — 81001 URINALYSIS AUTO W/SCOPE: CPT

## 2025-04-17 PROCEDURE — 87186 SC STD MICRODIL/AGAR DIL: CPT

## 2025-04-17 PROCEDURE — 99215 OFFICE O/P EST HI 40 MIN: CPT | Performed by: FAMILY MEDICINE

## 2025-04-17 PROCEDURE — 71045 X-RAY EXAM CHEST 1 VIEW: CPT

## 2025-04-17 PROCEDURE — 87077 CULTURE AEROBIC IDENTIFY: CPT

## 2025-04-17 PROCEDURE — 87086 URINE CULTURE/COLONY COUNT: CPT

## 2025-04-17 PROCEDURE — 99284 EMERGENCY DEPT VISIT MOD MDM: CPT

## 2025-04-17 PROCEDURE — 83605 ASSAY OF LACTIC ACID: CPT

## 2025-04-17 PROCEDURE — 87040 BLOOD CULTURE FOR BACTERIA: CPT | Mod: 91

## 2025-04-17 PROCEDURE — 700111 HCHG RX REV CODE 636 W/ 250 OVERRIDE (IP): Performed by: EMERGENCY MEDICINE

## 2025-04-17 PROCEDURE — 700105 HCHG RX REV CODE 258: Performed by: EMERGENCY MEDICINE

## 2025-04-17 PROCEDURE — 3074F SYST BP LT 130 MM HG: CPT | Performed by: FAMILY MEDICINE

## 2025-04-17 PROCEDURE — 80053 COMPREHEN METABOLIC PANEL: CPT

## 2025-04-17 PROCEDURE — 3078F DIAST BP <80 MM HG: CPT | Performed by: FAMILY MEDICINE

## 2025-04-17 RX ORDER — GRANULES FOR ORAL 3 G/1
3 POWDER ORAL
Qty: 3 EACH | Refills: 0 | Status: ACTIVE | OUTPATIENT
Start: 2025-04-17 | End: 2025-04-26

## 2025-04-17 RX ORDER — SODIUM CHLORIDE, SODIUM LACTATE, POTASSIUM CHLORIDE, AND CALCIUM CHLORIDE .6; .31; .03; .02 G/100ML; G/100ML; G/100ML; G/100ML
1000 INJECTION, SOLUTION INTRAVENOUS ONCE
Status: COMPLETED | OUTPATIENT
Start: 2025-04-17 | End: 2025-04-17

## 2025-04-17 RX ORDER — ACETAMINOPHEN 500 MG
500-1000 TABLET ORAL EVERY 8 HOURS PRN
Status: SHIPPED | COMMUNITY
End: 2025-04-24

## 2025-04-17 RX ORDER — SULFAMETHOXAZOLE AND TRIMETHOPRIM 800; 160 MG/1; MG/1
1 TABLET ORAL 2 TIMES DAILY
Status: SHIPPED | COMMUNITY
End: 2025-04-24

## 2025-04-17 RX ADMIN — MEROPENEM 500 MG: 500 INJECTION, POWDER, FOR SOLUTION INTRAVENOUS at 20:07

## 2025-04-17 RX ADMIN — SODIUM CHLORIDE, POTASSIUM CHLORIDE, SODIUM LACTATE AND CALCIUM CHLORIDE 1000 ML: 600; 310; 30; 20 INJECTION, SOLUTION INTRAVENOUS at 18:08

## 2025-04-17 ASSESSMENT — FIBROSIS 4 INDEX: FIB4 SCORE: 0.92

## 2025-04-17 ASSESSMENT — ENCOUNTER SYMPTOMS
CHILLS: 1
FEVER: 1

## 2025-04-17 NOTE — ED TRIAGE NOTES
Chief Complaint   Patient presents with    Back Pain    Fever      pt was seen by his PCP for lower back pain, was diagnosed with kidney infection and was given ABX. Pt was sent to ER for failed OP ABX. Pt continues to complains of fever and chills.

## 2025-04-17 NOTE — PROGRESS NOTES
Verbal consent was acquired by the patient to use [employer] ambient listening note generation during this visit.      Alek was seen today for follow-up.    Diagnoses and all orders for this visit:    E. coli UTI    ESBL (extended spectrum beta-lactamase) producing bacteria infection                  Assessment & Plan  1. Urinary tract infection with E. coli, extended spectrum beta-lactamase producing bacteria which is resistant to multiple antibiotics  Subacute condition, unstable, failed outpatient treatment and he still has persistent symptoms.  - Persistent symptoms: fever and chills, indicating possible progression of the infection  - Urine culture results: highly resistant bacterium, ineffective response to previously prescribed oral antibiotics  - Discussion: necessity of intravenous antibiotics due to resistance to oral antibiotics and potential spread to bloodstream  - Referral to Emergency Room for further evaluation, administration of intravenous antibiotics, and possible hospital admission for additional diagnostic tests, including blood and urine tests.  Patient needs higher level of care.  - Patient was taken in wheelchair to ER in stable condition. My medical assistant went with me to drop the patient at the ER for further evaluation.      Follow-up after hospitalization        Chief complaint::Diagnoses of E. coli UTI and ESBL (extended spectrum beta-lactamase) producing bacteria infection were pertinent to this visit.      History of Present Illness  The patient is a 59-year-old male who presents for evaluation of UTI symptoms.      UTI Symptoms    At last visit I saw him for fever, chills, urinary symptoms.  We checked his urine culture which showed E. coli ESBL.  His antibiotic was switched from Bactrim to Macrobid.  He is resistant to multiple antibiotics.  Blood cultures came back negative at that time  - Persistent symptoms, including chills and a sensation of fever  - Discomfort during  "urination  - A course of antibiotics was completed, which initially provided relief, but symptoms have since recurred  - The urine culture indicated a very strong, antibiotic-resistant bacteria  - Oral antibiotics have proven ineffective  - Intravenous antibiotics are recommended as the next step      Review of Systems   Constitutional:  Positive for chills, fever and malaise/fatigue.   Genitourinary:  Positive for dysuria, frequency and urgency.          Medications and Allergies:     No current outpatient medications on file.     No current facility-administered medications for this visit.       /78 (BP Location: Left arm, Patient Position: Sitting, BP Cuff Size: Adult)   Pulse 84   Temp 37.4 °C (99.3 °F) (Temporal)   Ht 1.626 m (5' 4\")   Wt 69 kg (152 lb 1.9 oz)   SpO2 97% , Body mass index is 26.11 kg/m².      Physical Exam  Constitutional:       Appearance: He is well-developed and well-groomed. He is ill-appearing.   HENT:      Head: Normocephalic and atraumatic.      Right Ear: External ear normal.      Left Ear: External ear normal.   Eyes:      General:         Right eye: No discharge.         Left eye: No discharge.      Conjunctiva/sclera: Conjunctivae normal.   Cardiovascular:      Rate and Rhythm: Normal rate.   Pulmonary:      Effort: Pulmonary effort is normal. No respiratory distress.   Musculoskeletal:      Cervical back: Neck supple.   Skin:     Findings: No rash.   Neurological:      Mental Status: He is alert.   Psychiatric:         Mood and Affect: Mood and affect normal.         Behavior: Behavior normal.                Please note that this dictation was created using voice recognition software. I have made every reasonable attempt to correct obvious errors, but I expect that there are errors of grammar and possibly content that I did not discover before finalizing the note.      "

## 2025-04-18 NOTE — ED NOTES
PIV placed, blood and BC x1 bri and ssent to lab w/ UA sample    ERP at the BS for eval, fluids infusing per ERP order

## 2025-04-18 NOTE — ED NOTES
Medication history reviewed with patient at bedside. Med rec is complete    Allergies reviewed    Patient completed bactrim 5 day supply 4/8 to 4/13/25    Patient did not  macrobid capsules prescribed on 4/11/25    Ok per patient to discuss medications with visitor present    Any anticoagulants taken in the last 14 days? No      Dispense history available in [facility] at the time of the medication history? No        Yaa Osborne Pharm.D., [facility]  ER Clinical Pharmacist

## 2025-04-18 NOTE — DISCHARGE INSTRUCTIONS
You were seen in the Emergency Department for urinary tract infection.    Labs were completed without significant acute abnormalities other than mild elevation of white blood cell count.    Please use 1,000mg of tylenol or 600mg of ibuprofen every 6 hours as needed for pain.    Please start antibiotics tomorrow morning.  This antibiotic will be taken every 3 days for 3 doses.    Please follow up with your primary care physician.    Return to the Emergency Department with persistent fevers greater than 100.4, abdominal pain or back pain, persistent vomiting, or other concern.

## 2025-04-18 NOTE — ED PROVIDER NOTES
ED Provider Note    CHIEF COMPLAINT  Chief Complaint   Patient presents with    Back Pain    Fever     EXTERNAL RECORDS REVIEWED  Patient was seen at primary provider 4/8/25 for fever.  Treated with Bactrim for urinary tract infection.  Urine cultures from that time returned positive for ESBL.  Blood cultures were negative.    HPI/ROS  LIMITATION TO HISTORY   Select: : None  OUTSIDE HISTORIAN(S):  None    Alek Carvalho is a 59 y.o. male who presents to the Emergency Department with fever.  The patient was seen about 2 weeks ago for urinary tract infection and placed on antibiotics by his primary provider.  He went back in for follow-up today and was sent here due to the fact he has had persistent fevers as well as urine culture showing ESBL.  The patient states he has had a fever every day however is not only taking his temperature.  He does state he is feeling slightly better than before.  He finished his antibiotic course last week.  He denies any abdominal pain, flank pain, cough or congestion, vomiting.  He does have ongoing urinary frequency and hesitancy however no pain with urination.  No blood in the urine.  He reports a current mild headache however otherwise feels well.    PAST MEDICAL HISTORY  Past Medical History:   Diagnosis Date    Abnormal glucose tolerance test     History of TIA (transient ischemic attack) 2/2013    Low HDL (under 40)         SURGICAL HISTORY  No past surgical history on file.     FAMILY HISTORY  Family History   Problem Relation Age of Onset    Cancer Father         lung?    No Known Problems Mother        SOCIAL HISTORY   reports that he has been smoking cigarettes. He started smoking about 38 years ago. He has a 14 pack-year smoking history. He has never used smokeless tobacco. He reports that he does not drink alcohol and does not use drugs.    CURRENT MEDICATIONS  Previous Medications    ACETAMINOPHEN (TYLENOL) 500 MG TAB    Take 500-1,000 mg by mouth every 8 hours as  needed for Mild Pain or Fever.    SULFAMETHOXAZOLE-TRIMETHOPRIM (BACTRIM DS) 800-160 MG TABLET    Take 1 Tablet by mouth 2 times a day. For 5 days 04/08/25 - 04/13/25. Completed prescription       ALLERGIES  Patient has no known allergies.    PHYSICAL EXAM  /77   Pulse 84   Temp 37.9 °C (100.3 °F) (Temporal)   Resp 15   SpO2 96%      Constitutional: Nontoxic appearing. Alert in no apparent distress.  HENT: Normocephalic, Atraumatic. Bilateral external ears normal. Nose normal.  Moist mucous membranes.  Oropharynx clear.  Eyes: Pupils are equal and reactive. Conjunctiva normal.   Neck: Supple, full range of motion  Heart: Regular rate and rhythm.  No murmurs.    Lungs: No respiratory distress, normal work of breathing. Lungs clear to auscultation bilaterally.  Abdomen Soft, no distention.  No tenderness to palpation.  No CVA tenderness to palpation  Musculoskeletal: Atraumatic. No obvious deformities noted.  No lower extremity edema.  Skin: Warm, Dry.  No erythema, No rash.   Neurologic: Alert and oriented x3. Moving all extremities spontaneously without focal deficits.  Psychiatric: Affect normal, Mood normal, Appears appropriate and not intoxicated.      DIAGNOSTIC STUDIES / PROCEDURES    LABS  Labs Reviewed   CBC WITH DIFFERENTIAL - Abnormal; Notable for the following components:       Result Value    WBC 13.0 (*)     RBC 4.04 (*)     Hemoglobin 12.8 (*)     Hematocrit 38.6 (*)     Platelet Count 467 (*)     MPV 8.8 (*)     Neutrophils-Polys 76.20 (*)     Lymphocytes 15.80 (*)     Neutrophils (Absolute) 9.93 (*)     Monos (Absolute) 0.92 (*)     All other components within normal limits   COMP METABOLIC PANEL - Abnormal; Notable for the following components:    Glucose 117 (*)     Alkaline Phosphatase 164 (*)     Globulin 4.2 (*)     All other components within normal limits   URINALYSIS - Abnormal; Notable for the following components:    Leukocyte Esterase Large (*)     Occult Blood Small (*)     All  other components within normal limits   URINE MICROSCOPIC (W/UA) - Abnormal; Notable for the following components:    WBC >100 (*)     RBC 6-10 (*)     Bacteria Moderate (*)     All other components within normal limits   LACTIC ACID   BLOOD CULTURE   BLOOD CULTURE   ESTIMATED GFR   URINE CULTURE(NEW)         RADIOLOGY  I have independently interpreted the diagnostic imaging associated with this visit and am waiting the final reading from the radiologist.   My preliminary interpretation is as follows: no infiltrate    Radiologist interpretation:  DX-CHEST-PORTABLE (1 VIEW)   Final Result      No evidence of acute cardiopulmonary process.            COURSE & MEDICAL DECISION MAKING    Sepsis: Infection was suspected 5:49PM (Time). Sepsis pathway was initiated. Fluids not needed (no hypotension or lactate greater than or equal to 4). Antibiotics were given per protocol.    ASSESSMENT, COURSE AND PLAN  Care Narrative: Patient who was recently treated as an outpatient for urinary tract infection who had cultures returned with ESBL now presenting with persistent urinary symptoms and fever.  The patient has borderline fever on arrival with otherwise normal vital signs.  He is overall well-appearing and has minimal complaints at this time.  He has a benign abdominal exam and no flank tenderness concerning for pyelonephritis.  Sepsis workup was initiated showing a white blood cell count of 13 however normal lactate.  He has no renal dysfunction or electrolyte abnormality.  Urinalysis does show evidence of ongoing infection.  Blood cultures were drawn and pending.    I had a long discussion with pharmacy and we reviewed his cultures.  The only real treatment options are either IV meropenem which would require a 3 to 5-day hospital stay or oral fosfomycin.  After discussion with the patient he really cannot stay that long due to work therefore we will give a dose of meropenem here and discharge home with 3 dose course of  fosfomycin over the next 9 days.  The patient understands importance of return to the emergency department with persistent fevers or development of new symptoms.  We have new blood and urine cultures pending.    8:49 PM - Upon reassessment, patient is resting comfortably with normal vital signs.  No new complaints at this time.  Discussed results with patient and/or family as well as importance of primary care follow up.  Patient understands plan of care and strict return precautions for new or changing symptoms.       ADDITIONAL PROBLEM LIST  Problem #1: Acute cystitis due to ESBL -given dose of meropenem here, will discharge home with fosfomycin, repeat cultures pending        DISPOSITION AND DISCUSSIONS  Discussion of management with other [facility] or appropriate source(s): Pharmacy [facility] regarding antibiotics      Escalation of care considered, and ultimately not performed:acute inpatient care management, however at this time, the patient is most appropriate for outpatient management      DISPOSITION:  Patient will be discharged home in stable condition.    FOLLOW UP:  Kuldeep Cadet M.D.  [address]  [address]  [facility] [id number] [phone]    Schedule an appointment as soon as possible for a visit       [facility], Emergency Dept  [address]  [facility] [id number] [phone]    If symptoms worsen      OUTPATIENT MEDICATIONS:  New Prescriptions    FOSFOMYCIN (MONUROL) 3 GM PACK    Take 3 g by mouth every 3 days for 3 doses.         FINAL DIAGNOSIS  1. ESBL (extended spectrum beta-lactamase) producing bacteria infection    2. Acute cystitis without hematuria

## 2025-04-22 LAB
BACTERIA BLD CULT: NORMAL
BACTERIA BLD CULT: NORMAL
SIGNIFICANT IND 70042: NORMAL
SIGNIFICANT IND 70042: NORMAL
SITE SITE: NORMAL
SITE SITE: NORMAL
SOURCE SOURCE: NORMAL
SOURCE SOURCE: NORMAL

## 2025-04-22 NOTE — ED NOTES
ED Positive Culture Follow-up/Notification Note:    Date: 4/21/2025     Patient seen in the ED on 4/17/2025 for fevers and UTI.  Patient reports he was seen by his PCP 2 weeks ago and placed on antibiotics for a UTI.  Patient reports persistent fevers, urinary frequency, and hesitancy.  He denies any abdominal pain, flank pain, cough, or vomiting.  Patient received meropenem 500 mg IV X 1 dose in the emergency department.    1. ESBL (extended spectrum beta-lactamase) producing bacteria infection    2. Acute cystitis without hematuria       Discharge Medication List as of 4/17/2025  8:50 PM        START taking these medications    Details   fosfomycin (MONUROL) 3 GM Pack Take 3 g by mouth every 3 days for 3 doses., Disp-3 Each, R-0, Normal             Allergies: Patient has no known allergies.     Vitals:    04/17/25 2007 04/17/25 2017 04/17/25 2037 04/17/25 2050   BP:  126/81 123/78 124/77   Pulse: 83 73 75 75   Resp: 18   (!) 38   Temp:    37.6 °C (99.6 °F)   TempSrc:    Temporal   SpO2: 97% 96% 97% 95%       Final cultures:   Results       Procedure Component Value Units Date/Time    Urine Culture (New) [[id number]]  (Abnormal)  (Susceptibility) Collected: 04/17/25 1810    Order Status: Completed Specimen: Urine Updated: 04/20/25 0953     Significant Indicator POS     Source UR     Site -     Culture Result -      Escherichia coli ESBL  ,000 cfu/mL  Extended Spectrum Beta-lactamase (ESBL) isolated.  ESBL's may be clinically resistant to therapy with  Penicillins,Cephalosporins or Aztreonam despite  apparent in vitro susceptibility to some of these agents.  The patient requires contact isolation.  Please contact pharmacy or an Infectious Disease Specialist  if you have any questions about appropriate therapy.      Susceptibility       Escherichia coli esbl (1)       Antibiotic Interpretation Microscan   Method Status    Ampicillin/sulbactam Intermediate 16/8 mcg/mL WALKER Final    Amikacin  [*]  Sensitive <=16  mcg/mL WALKER Final    Ampicillin Resistant >16 mcg/mL WALKER Final    Aztreonam  [*]  Resistant >16 mcg/mL WALKER Final    Ceftolozane+Tazobactam  [*]  Sensitive <=2 mcg/mL WALKER Final    Ceftriaxone Resistant >32 mcg/mL WALKER Final    Ceftazidime  [*]  Resistant >16 mcg/mL WALKER Final    Cefazolin Resistant >16 mcg/mL WALKER Final     Breakpoints when Cefazolin is used for therapy of infections  other than uncomplicated UTIs due to Enterobacterales are as  follows:  WALKER and Interpretation:  <=2 S  4 I  >=8 R         Ciprofloxacin Resistant >2 mcg/mL WALKER Final    Cefepime Resistant >16 mcg/mL WALKER Final    Cefuroxime Resistant >16 mcg/mL WALKER Final    Ceftazidime+Avibactam  [*]  Sensitive <=4 mcg/mL WALKER Final    Ertapenem  [*]  Sensitive <=0.5 mcg/mL WALKER Final    Nitrofurantoin Sensitive <=32 mcg/mL WALKER Final    Gentamicin Resistant >8 mcg/mL WALKER Final    Imipenem  [*]  Sensitive <=1 mcg/mL WALKER Final    Levofloxacin Resistant >4 mcg/mL WALKER Final    Meropenem Sensitive <=1 mcg/mL WALKER Final    Meropenem/Vaborbactam Sensitive <=2 mcg/mL WALKER Final    Minocycline Sensitive <=4 mcg/mL WALKER Final    Pip/Tazobactam Sensitive <=8 mcg/mL WALKER Final    Trimeth/Sulfa Resistant >2/38 mcg/mL WALKER Final    Tetracycline  [*]  Sensitive <=4 mcg/mL WALKER Final    Tigecycline Sensitive <=2 mcg/mL WALKER Final    Tobramycin Resistant >8 mcg/mL WALKER Final               [*]  Suppressed Antibiotic                   Blood Culture - Draw one from central line and one from peripheral site [[id number]] Collected: 04/17/25 1810    Order Status: Completed Specimen: Blood from Peripheral Updated: 04/17/25 2019     Significant Indicator NEG     Source BLD     Site PERIPHERAL     Culture Result No Growth  Note: Blood cultures are incubated for 5 days and  are monitored continuously.Positive blood cultures  are called to the RN and reported as soon as  they are identified.  Blood culture testing and Gram stain, if indicated, are  performed at [facility],  [address] [place] [address].  Positive blood  cultures are sent to [facility],  [address] [place] [address], for organism identification  and susceptibility testing.      Blood Culture - Draw one from central line and one from peripheral site [[id number]] Collected: 04/17/25 1821    Order Status: Completed Specimen: Blood from Line Updated: 04/17/25 2019     Significant Indicator NEG     Source BLD     Site peripheral     Culture Result No Growth  Note: Blood cultures are incubated for 5 days and  are monitored continuously.Positive blood cultures  are called to the RN and reported as soon as  they are identified.  Blood culture testing and Gram stain, if indicated, are  performed at [facility],  [address] [place] [address].  Positive blood  cultures are sent to [facility],  [address] [place] [address], for organism identification  and susceptibility testing.      Urinalysis [[id number]]  (Abnormal) Collected: 04/17/25 1810    Order Status: Completed Specimen: Urine Updated: 04/17/25 1940     Color Yellow     Character Clear     Specific Gravity 1.010     Ph 6.5     Glucose Negative mg/dL      Ketones Negative mg/dL      Protein Negative mg/dL      Bilirubin Negative     Urobilinogen, Urine 1.0 EU/dL      Nitrite Negative     Leukocyte Esterase Large     Occult Blood Small     Micro Urine Req Microscopic            Plan:   Urine culture is positive for ESBL e coli. Appropriate antibiotic therapy prescribed. No changes required based upon culture result.  Spoke to patient to notify of positive culture result and encourage compliance with prescribed antibiotics, patient reports he is doing well with no concerns at this time.    Chris Rodriguez, PharmD

## 2025-04-24 ENCOUNTER — OFFICE VISIT (OUTPATIENT)
Dept: MEDICAL GROUP | Facility: MEDICAL CENTER | Age: 60
End: 2025-04-24
Payer: COMMERCIAL

## 2025-04-24 ENCOUNTER — RESULTS FOLLOW-UP (OUTPATIENT)
Dept: MEDICAL GROUP | Facility: MEDICAL CENTER | Age: 60
End: 2025-04-24

## 2025-04-24 ENCOUNTER — HOSPITAL ENCOUNTER (OUTPATIENT)
Facility: MEDICAL CENTER | Age: 60
End: 2025-04-24
Attending: FAMILY MEDICINE
Payer: COMMERCIAL

## 2025-04-24 VITALS
BODY MASS INDEX: 25.59 KG/M2 | WEIGHT: 149.91 LBS | HEART RATE: 61 BPM | DIASTOLIC BLOOD PRESSURE: 76 MMHG | OXYGEN SATURATION: 98 % | SYSTOLIC BLOOD PRESSURE: 116 MMHG | HEIGHT: 64 IN | TEMPERATURE: 97.5 F

## 2025-04-24 DIAGNOSIS — E55.9 VITAMIN D DEFICIENCY: ICD-10-CM

## 2025-04-24 DIAGNOSIS — B96.29 URINARY TRACT INFECTION DUE TO EXTENDED-SPECTRUM BETA LACTAMASE (ESBL) PRODUCING ESCHERICHIA COLI: ICD-10-CM

## 2025-04-24 DIAGNOSIS — Z16.12 URINARY TRACT INFECTION DUE TO EXTENDED-SPECTRUM BETA LACTAMASE (ESBL) PRODUCING ESCHERICHIA COLI: ICD-10-CM

## 2025-04-24 DIAGNOSIS — E78.2 MIXED HYPERLIPIDEMIA: ICD-10-CM

## 2025-04-24 DIAGNOSIS — R73.03 PREDIABETES: ICD-10-CM

## 2025-04-24 DIAGNOSIS — Z23 NEED FOR VACCINATION: ICD-10-CM

## 2025-04-24 DIAGNOSIS — N39.0 URINARY TRACT INFECTION DUE TO EXTENDED-SPECTRUM BETA LACTAMASE (ESBL) PRODUCING ESCHERICHIA COLI: ICD-10-CM

## 2025-04-24 LAB
APPEARANCE UR: CLEAR
BILIRUB UR STRIP-MCNC: NEGATIVE MG/DL
COLOR UR AUTO: YELLOW
GLUCOSE UR STRIP.AUTO-MCNC: NEGATIVE MG/DL
KETONES UR STRIP.AUTO-MCNC: NEGATIVE MG/DL
LEUKOCYTE ESTERASE UR QL STRIP.AUTO: ABNORMAL
NITRITE UR QL STRIP.AUTO: NEGATIVE
PH UR STRIP.AUTO: 5.5 [PH] (ref 5–8)
PROT UR QL STRIP: NEGATIVE MG/DL
RBC UR QL AUTO: NEGATIVE
SP GR UR STRIP.AUTO: 1.01
UROBILINOGEN UR STRIP-MCNC: 0.2 MG/DL

## 2025-04-24 PROCEDURE — 90746 HEPB VACCINE 3 DOSE ADULT IM: CPT | Performed by: FAMILY MEDICINE

## 2025-04-24 PROCEDURE — 99214 OFFICE O/P EST MOD 30 MIN: CPT | Mod: 25 | Performed by: FAMILY MEDICINE

## 2025-04-24 PROCEDURE — 87086 URINE CULTURE/COLONY COUNT: CPT

## 2025-04-24 PROCEDURE — 81002 URINALYSIS NONAUTO W/O SCOPE: CPT | Performed by: FAMILY MEDICINE

## 2025-04-24 PROCEDURE — 3078F DIAST BP <80 MM HG: CPT | Performed by: FAMILY MEDICINE

## 2025-04-24 PROCEDURE — 3074F SYST BP LT 130 MM HG: CPT | Performed by: FAMILY MEDICINE

## 2025-04-24 PROCEDURE — 90471 IMMUNIZATION ADMIN: CPT | Performed by: FAMILY MEDICINE

## 2025-04-24 ASSESSMENT — FIBROSIS 4 INDEX: FIB4 SCORE: 0.69

## 2025-04-24 ASSESSMENT — ENCOUNTER SYMPTOMS
CHILLS: 0
FEVER: 0

## 2025-04-24 NOTE — PROGRESS NOTES
Subjective  Alek Carvalho is a 59 y.o. male who presents today for elevated PSA. No family history of prostate cancer. Had an Ecoli infection on the urine culture at that time. Had PO and IV antibiotics.     Prostatic Specific Antigen Tot   Date Value Ref Range Status   04/08/2025 11.10 (H) 0.00 - 4.00 ng/mL Final     Comment:     Performed using Roche karlene e immunoassay analyzer. tPSA values determined  on patient samples by different testing procedures cannot be directly  compared with one another and could be the cause of erroneous medical  interpretations. If there is a change in the tPSA assay procedure used while  monitoring therapy, then new baselines may need to be established when  comparing previous results.     10/29/2022 3.36 0.00 - 4.00 ng/mL Final     Comment:     Performed using Roche karlene e immunoassay analyzer. tPSA values determined  on patient samples by different testing procedures cannot be directly  compared with one another and could be the cause of erroneous medical  interpretations. If there is a change in the tPSA assay procedure used while  monitoring therapy, then new baselines may need to be established when  comparing previous results.     10/19/2020 2.26 0.00 - 4.00 ng/mL Final     Comment:     Effective 3/18/2020, this assay is performed using Roche karlene e immunoassay  analyzer. tPSA values determined on patient samples by different testing  procedures cannot be directly compared with one another and could be the  cause of erroneous medical interpretations. If there is a change in the tPSA  assay procedure used while monitoring therapy, then new baselines may need  to be established when comparing previous results with results received  after 3/17/2020.         Family History   Problem Relation Age of Onset    Cancer Father         lung?    No Known Problems Mother        Social History     Socioeconomic History    Marital status:    Tobacco Use    Smoking status:  Every Day     Current packs/day: 0.00     Average packs/day: 0.5 packs/day for 28.0 years (14.0 ttl pk-yrs)     Types: Cigarettes     Start date: 5/7/1986     Last attempt to quit: 5/7/2014     Years since quitting: 10.9    Smokeless tobacco: Never   Substance and Sexual Activity    Alcohol use: No    Drug use: No    Sexual activity: Yes     Comment: Work as        No past surgical history on file.    Past Medical History:   Diagnosis Date    Abnormal glucose tolerance test     History of TIA (transient ischemic attack) 2/2013    Low HDL (under 40)        Current Outpatient Medications   Medication Sig    sulfamethoxazole-trimethoprim (BACTRIM DS) 800-160 MG tablet Take 1 Tablet by mouth 2 times a day. For 5 days 04/08/25 - 04/13/25. Completed prescription    acetaminophen (TYLENOL) 500 MG Tab Take 500-1,000 mg by mouth every 8 hours as needed for Mild Pain or Fever.    fosfomycin (MONUROL) 3 GM Pack Take 3 g by mouth every 3 days for 3 doses.       No Known Allergies    Objective  There were no vitals taken for this visit.  Physical Exam  Constitutional:       Appearance: Normal appearance. He is normal weight.   Neurological:      Mental Status: He is alert.           Labs:   CBC   Lab Results   Component Value Date/Time    WBC 13.0 (H) 04/17/2025 1810    RBC 4.04 (L) 04/17/2025 1810    HEMOGLOBIN 12.8 (L) 04/17/2025 1810    HEMATOCRIT 38.6 (L) 04/17/2025 1810    MCV 95.5 04/17/2025 1810    MCH 31.7 04/17/2025 1810    MCHC 33.2 04/17/2025 1810    RDW 46.7 04/17/2025 1810    MPV 8.8 (L) 04/17/2025 1810    LYMPHOCYTES 15.80 (L) 04/17/2025 1810    LYMPHS 2.06 04/17/2025 1810    MONOCYTES 7.10 04/17/2025 1810    MONOS 0.92 (H) 04/17/2025 1810    EOSINOPHILS 0.20 04/17/2025 1810    EOS 0.02 04/17/2025 1810    BASOPHILS 0.20 04/17/2025 1810    BASO 0.03 04/17/2025 1810    NRBC 0.00 04/17/2025 1810       BMP   Lab Results   Component Value Date/Time    SODIUM 135 04/17/2025 1810    POTASSIUM 4.1 04/17/2025 1810     CHLORIDE 99 04/17/2025 1810    CO2 23 04/17/2025 1810    GLUCOSE 117 (H) 04/17/2025 1810    BUN 15 04/17/2025 1810    CREATININE 1.07 04/17/2025 1810    CALCIUM 9.0 04/17/2025 1810     PSA   Lab Results   Component Value Date/Time    PSATOTAL 11.10 (H) 04/08/2025 1708       Imaging:   None relevant        Assessment & Plan    We discussed today the nature and history of PSA testing for prostate cancer screening. I explained that PSA elevation can be one of the earliest indicators of a risk of prostate cancer, but that PSA elevation can also result from numerous benign causes including urinary tract infection, chronic prostatitis, benign prostatic hyperplasia, urinary retention or elevated post-void residual volumes, mild prostatic trauma from activities requiring a saddle-type seat, and even having had a blood sample taken for PSA after a digital rectal exam or after recent sexual activity. For these reasons, I generally advise repeating a PSA after an initial elevation before proceeding with invasive testing such as a prostate biopsy.     We also discussed in full detail the concept of shared decision making and I explained updated AUA guidelines on prostate cancer screening and early detection. We incorporated patient age and overall health in this discussion, as well family history. Finally, we discussed the natural history of prostate cancer and some of the potential risks associated with both prostate biopsy and treatment of localized prostate cancer.     Likely due to documented infection. Will have him get PSA in 3 months and return for follow up.       Bruno Godwin M.D., F.A.C.S.  Urologic Oncology  Vice-Chair, Department of Surgery  [facility]

## 2025-04-24 NOTE — PROGRESS NOTES
FAMILY MEDICINE VISIT                                                               Assessment/Plan:         1. Urinary tract infection due to extended-spectrum beta lactamase (ESBL) producing Escherichia coli  Chronic condition, improving, urinalysis in office showed only trace leukocytes.  Send this for culture to make sure infection is completely resolved  - POCT Urinalysis  - URINE CULTURE(NEW); Future    2. Vitamin D deficiency  Chronic condition, unstable, take vitamin D supplementation  - VITAMIN D,25 HYDROXY (DEFICIENCY); Future    3. Prediabetes  Chronic condition, unstable, counseled to eat healthy diet and exercise for improving these numbers.  Recheck labs in 4-month  - HEMOGLOBIN A1C; Future    4. Mixed hyperlipidemia  Chronic condition, unstable, counseled to eat healthy diet and exercise for improving these numbers.  Recheck labs in 4-month  - Comp Metabolic Panel; Future  - Lipid Profile; Future    5. Need for vaccination  Hepatitis B second dose given today  - Hep B Adult 20+       Follow up in 4 months for      Chief complaint::Diagnoses of Urinary tract infection due to extended-spectrum beta lactamase (ESBL) producing Escherichia coli, Vitamin D deficiency, Prediabetes, Mixed hyperlipidemia, and Need for vaccination were pertinent to this visit.    History of present illness: Alek Carvalho is a 59 y.o. male who presented for hospital follow-up.    He was seen in ER for ESBL E. coli.  He was treated with IV antibiotics and was prescribed fosfomycin.  He is feeling much better today.  Denies any symptoms.  Has history of prediabetes, mixed hyperlipidemia, vitamin D deficiency.      Review of systems:     Review of Systems   Constitutional:  Negative for chills and fever.   Genitourinary:  Negative for dysuria, hematuria and urgency.        Medications and Allergies:     Current Outpatient Medications   Medication Sig Dispense Refill   • fosfomycin (MONUROL) 3 GM Pack Take 3 g by mouth  "every 3 days for 3 doses. 3 Each 0     No current facility-administered medications for this visit.          Vitals:    /76 (BP Location: Left arm, Patient Position: Sitting, BP Cuff Size: Adult)   Pulse 61   Temp 36.4 °C (97.5 °F) (Temporal)   Ht 1.626 m (5' 4\")   Wt 68 kg (149 lb 14.6 oz)   SpO2 98%  Body mass index is 25.73 kg/m².    Physical Exam:     Physical Exam  Constitutional:       Appearance: Normal appearance. He is well-developed and well-groomed.   HENT:      Head: Normocephalic and atraumatic.      Right Ear: External ear normal.      Left Ear: External ear normal.   Eyes:      General:         Right eye: No discharge.         Left eye: No discharge.      Conjunctiva/sclera: Conjunctivae normal.   Cardiovascular:      Rate and Rhythm: Normal rate.   Pulmonary:      Effort: Pulmonary effort is normal. No respiratory distress.   Musculoskeletal:      Cervical back: Neck supple.   Skin:     Findings: No rash.   Neurological:      Mental Status: He is alert.   Psychiatric:         Mood and Affect: Mood and affect normal.         Behavior: Behavior normal.        Labs:  I reviewed with patient recent labs resulted on 4/17/2025.        Please note that this dictation was created using voice recognition software. I have made every reasonable attempt to correct obvious errors, but I expect that there are errors of grammar and possibly content that I did not discover before finalizing the note.      "

## 2025-04-27 LAB
BACTERIA UR CULT: NORMAL
SIGNIFICANT IND 70042: NORMAL
SITE SITE: NORMAL
SOURCE SOURCE: NORMAL

## 2025-04-28 ENCOUNTER — RESULTS FOLLOW-UP (OUTPATIENT)
Dept: MEDICAL GROUP | Facility: MEDICAL CENTER | Age: 60
End: 2025-04-28

## 2025-04-29 ENCOUNTER — OFFICE VISIT (OUTPATIENT)
Dept: UROLOGY | Facility: MEDICAL CENTER | Age: 60
End: 2025-04-29
Payer: COMMERCIAL

## 2025-04-29 DIAGNOSIS — R97.20 ELEVATED PSA: ICD-10-CM

## 2025-05-16 NOTE — Clinical Note
REFERRAL APPROVAL NOTICE         Sent on May 16, 2025                   Alek aCrvalho  [address] [place] [address]                   Dear Mr. Lexi Carvalho,    After a careful review of the medical information and benefit coverage, [facility] has processed your referral. See below for additional details.    If applicable, you must be actively enrolled with your insurance for coverage of the authorized service. If you have any questions regarding your coverage, please contact your insurance directly.    REFERRAL INFORMATION   Referral #:  [id number]  Referred-To Department    Referred-By Provider:  Urology    Bruno Godwin M.D.   [facility]      [address]  [place] [address]  [phone] [address]  [place] [id number] [phone]    Referral Start Date:  05/16/2025  Referral End Date:   05/16/2027             SCHEDULING  If you do not already have an appointment, please call [phone] to make an appointment.     MORE INFORMATION  If you do not already have a [employer] account, sign up at: [email or web address]  You can access your medical information, make appointments, see lab results, billing information, and more.  If you have questions regarding this referral, please contact  the [facility] Referrals department at:             [phone]. Monday - Friday 8:00AM - 5:00PM.     Sincerely,    [facility]

## 2025-05-19 NOTE — PROGRESS NOTES
Subjective  Alek Carvalho is a 59 y.o. male who presents today for follow up.  He had a PSA of 11 at which time he had an active infection.  This was in April 2025.  Plan was to get a PSA in 3 months.  He returns today with complaints of gross hematuria. Lasted one week.         Family History   Problem Relation Age of Onset    Cancer Father         lung?    No Known Problems Mother        Social History[1]    Past Surgical History[2]    Past Medical History[3]    No current outpatient medications on file.       Allergies[4]    Objective  There were no vitals taken for this visit.  Physical Exam      Labs:     POC UA  Lab Results   Component Value Date/Time    POCCOLOR Yellow 04/24/2025 02:49 PM    POCAPPEAR Clear 04/24/2025 02:49 PM    POCLEUKEST Trace 04/24/2025 02:49 PM    POCNITRITE Negative 04/24/2025 02:49 PM    POCUROBILIGE 0.2 04/24/2025 02:49 PM    POCPROTEIN Negative 04/24/2025 02:49 PM    POCURPH 5.5 04/24/2025 02:49 PM    POCBLOOD Negative 04/24/2025 02:49 PM    POCSPGRV 1.015 04/24/2025 02:49 PM    POCKETONES Negative 04/24/2025 02:49 PM    POCBILIRUBIN Negative 04/24/2025 02:49 PM    POCGLUCUA Negative 04/24/2025 02:49 PM        CMP  Lab Results   Component Value Date/Time    SODIUM 135 04/17/2025 1810    POTASSIUM 4.1 04/17/2025 1810    CHLORIDE 99 04/17/2025 1810    CO2 23 04/17/2025 1810    ANION 13.0 04/17/2025 1810    GLUCOSE 117 (H) 04/17/2025 1810    BUN 15 04/17/2025 1810    CREATININE 1.07 04/17/2025 1810    GFRCKD 80 04/17/2025 1810    CALCIUM 9.0 04/17/2025 1810    CORRCALC 9.2 04/17/2025 1810    ASTSGOT 29 04/17/2025 1810    ALTSGPT 28 04/17/2025 1810    ALKPHOSPHAT 164 (H) 04/17/2025 1810    TBILIRUBIN 0.4 04/17/2025 1810    ALBUMIN 3.8 04/17/2025 1810    TOTPROTEIN 8.0 04/17/2025 1810    GLOBULIN 4.2 (H) 04/17/2025 1810    AGRATIO 0.9 04/17/2025 1810       BMP  Lab Results   Component Value Date/Time    SODIUM 135 04/17/2025 1810    POTASSIUM 4.1 04/17/2025 1810    CHLORIDE 99  04/17/2025 1810    CO2 23 04/17/2025 1810    GLUCOSE 117 (H) 04/17/2025 1810    BUN 15 04/17/2025 1810    CREATININE 1.07 04/17/2025 1810    CALCIUM 9.0 04/17/2025 1810       CBC  Lab Results   Component Value Date/Time    WBC 13.0 (H) 04/17/2025 1810    RBC 4.04 (L) 04/17/2025 1810    HEMOGLOBIN 12.8 (L) 04/17/2025 1810    HEMATOCRIT 38.6 (L) 04/17/2025 1810    MCV 95.5 04/17/2025 1810    MCH 31.7 04/17/2025 1810    MCHC 33.2 04/17/2025 1810    RDW 46.7 04/17/2025 1810    MPV 8.8 (L) 04/17/2025 1810    LYMPHOCYTES 15.80 (L) 04/17/2025 1810    LYMPHS 2.06 04/17/2025 1810    MONOCYTES 7.10 04/17/2025 1810    MONOS 0.92 (H) 04/17/2025 1810    EOSINOPHILS 0.20 04/17/2025 1810    EOS 0.02 04/17/2025 1810    BASOPHILS 0.20 04/17/2025 1810    BASO 0.03 04/17/2025 1810    NRBC 0.00 04/17/2025 1810       A1C  Lab Results   Component Value Date/Time    HBA1C 6.2 (H) 04/08/2025 1708    AVGLUC 131 04/08/2025 1708       Imaging:   None relevant.     Assessment & Plan  Due to presence of gross hematuria they are in the high risk category.  We discussed that the recommendation would be for a CT urogram and cystoscopy to evaluate the upper tracts and the bladder for possible malignancy.  We reviewed the various causes of hematuria including benign or malignant conditions such as kidney stones, urinary tract infection, kidney cancer, bladder cancer, etc. We reviewed the risks and benefits of the cystoscopy procedure including hematuria, dysuria, and urinary tract infection.  The patient indicated their understanding and agreement with this plan.         Bruno Godwin M.D., F.A.C.S.  Urologic Oncology  Vice-Chair, Department of Surgery  [facility]         [1]   Social History  Socioeconomic History    Marital status:    Tobacco Use    Smoking status: Every Day     Current packs/day: 0.00     Average packs/day: 0.5 packs/day for 28.0 years (14.0 ttl pk-yrs)     Types: Cigarettes     Start date: 5/7/1986     Last  attempt to quit: 2014     Years since quittin.0    Smokeless tobacco: Never   Substance and Sexual Activity    Alcohol use: No    Drug use: No    Sexual activity: Yes     Comment: Work as    [2] No past surgical history on file.  [3]   Past Medical History:  Diagnosis Date    Abnormal glucose tolerance test     History of TIA (transient ischemic attack) 2013    Low HDL (under 40)    [4] No Known Allergies

## 2025-05-20 ENCOUNTER — OFFICE VISIT (OUTPATIENT)
Dept: UROLOGY | Facility: MEDICAL CENTER | Age: 60
End: 2025-05-20
Payer: COMMERCIAL

## 2025-05-20 DIAGNOSIS — R31.0 GROSS HEMATURIA: Primary | ICD-10-CM

## 2025-05-20 LAB
APPEARANCE UR: CLEAR
BILIRUB UR STRIP-MCNC: NORMAL MG/DL
COLOR UR AUTO: YELLOW
GLUCOSE UR STRIP.AUTO-MCNC: NORMAL MG/DL
KETONES UR STRIP.AUTO-MCNC: NORMAL MG/DL
LEUKOCYTE ESTERASE UR QL STRIP.AUTO: NORMAL
NITRITE UR QL STRIP.AUTO: NORMAL
PH UR STRIP.AUTO: 6 [PH] (ref 5–8)
PROT UR QL STRIP: NORMAL MG/DL
RBC UR QL AUTO: NORMAL
SP GR UR STRIP.AUTO: 1.02
UROBILINOGEN UR STRIP-MCNC: 0.2 MG/DL

## 2025-05-20 PROCEDURE — 81002 URINALYSIS NONAUTO W/O SCOPE: CPT | Performed by: UROLOGY

## 2025-05-20 PROCEDURE — 52000 CYSTOURETHROSCOPY: CPT | Performed by: UROLOGY

## 2025-05-20 PROCEDURE — 99204 OFFICE O/P NEW MOD 45 MIN: CPT | Mod: 25 | Performed by: UROLOGY

## 2025-05-20 NOTE — PROGRESS NOTES
Subjective  Alek Carvalho is a 59 y.o. male who presents today for cystoscopy to evaluate Gross Hematuria.     Family History   Problem Relation Age of Onset    Cancer Father         lung?    No Known Problems Mother        Social History[1]    Past Surgical History[2]    Past Medical History[3]    No current outpatient medications on file.       Allergies[4]    Objective  There were no vitals taken for this visit.  Physical Exam      Labs:     POC UA  Lab Results   Component Value Date/Time    POCCOLOR yellow 05/20/2025 01:06 AM    POCAPPEAR clear 05/20/2025 01:06 AM    POCLEUKEST trace 05/20/2025 01:06 AM    POCNITRITE neg 05/20/2025 01:06 AM    POCUROBILIGE 0.2 05/20/2025 01:06 AM    POCPROTEIN trace 05/20/2025 01:06 AM    POCURPH 6.0 05/20/2025 01:06 AM    POCBLOOD trace 05/20/2025 01:06 AM    POCSPGRV 1.020 05/20/2025 01:06 AM    POCKETONES neg 05/20/2025 01:06 AM    POCBILIRUBIN neg 05/20/2025 01:06 AM    POCGLUCUA neg 05/20/2025 01:06 AM        CMP   Lab Results   Component Value Date/Time    SODIUM 135 04/17/2025 1810    POTASSIUM 4.1 04/17/2025 1810    CHLORIDE 99 04/17/2025 1810    CO2 23 04/17/2025 1810    ANION 13.0 04/17/2025 1810    GLUCOSE 117 (H) 04/17/2025 1810    BUN 15 04/17/2025 1810    CREATININE 1.07 04/17/2025 1810    GFRCKD 80 04/17/2025 1810    CALCIUM 9.0 04/17/2025 1810    CORRCALC 9.2 04/17/2025 1810    ASTSGOT 29 04/17/2025 1810    ALTSGPT 28 04/17/2025 1810    ALKPHOSPHAT 164 (H) 04/17/2025 1810    TBILIRUBIN 0.4 04/17/2025 1810    ALBUMIN 3.8 04/17/2025 1810    TOTPROTEIN 8.0 04/17/2025 1810    GLOBULIN 4.2 (H) 04/17/2025 1810    AGRATIO 0.9 04/17/2025 1810       BMP  Lab Results   Component Value Date/Time    SODIUM 135 04/17/2025 1810    POTASSIUM 4.1 04/17/2025 1810    CHLORIDE 99 04/17/2025 1810    CO2 23 04/17/2025 1810    GLUCOSE 117 (H) 04/17/2025 1810    BUN 15 04/17/2025 1810    CREATININE 1.07 04/17/2025 1810    CALCIUM 9.0 04/17/2025 1810       CBC  Lab Results    Component Value Date/Time    WBC 13.0 (H) 04/17/2025 1810    RBC 4.04 (L) 04/17/2025 1810    HEMOGLOBIN 12.8 (L) 04/17/2025 1810    HEMATOCRIT 38.6 (L) 04/17/2025 1810    MCV 95.5 04/17/2025 1810    MCH 31.7 04/17/2025 1810    MCHC 33.2 04/17/2025 1810    RDW 46.7 04/17/2025 1810    MPV 8.8 (L) 04/17/2025 1810    LYMPHOCYTES 15.80 (L) 04/17/2025 1810    LYMPHS 2.06 04/17/2025 1810    MONOCYTES 7.10 04/17/2025 1810    MONOS 0.92 (H) 04/17/2025 1810    EOSINOPHILS 0.20 04/17/2025 1810    EOS 0.02 04/17/2025 1810    BASOPHILS 0.20 04/17/2025 1810    BASO 0.03 04/17/2025 1810    NRBC 0.00 04/17/2025 1810       A1C  Lab Results   Component Value Date/Time    HBA1C 6.2 (H) 04/08/2025 1708    AVGLUC 131 04/08/2025 1708       Procedure    Procedure performed: Cystoscopy      Surgeon: Dr. Lv Godwin    Indications For Procedure: Gross Hematuria    Blood Loss: 0 cc     Anesthesia: Lidocaine jelly     Specimen: None     Findings:     1. Urethra: no lesions or masses    2. Bladder: mild diffuse erythema    3. Bilateral ureteral jets observed with clear efflux      4. Prostate: moderate lateral lobe hypertrophy, no median lobe, 4 cm prostate length     Description of Procedure: The patient was prepped and draped in the usual sterile fashion.  A 17Fr flexible cystoscope was advanced along the urethra into the bladder under direct vision.  We performed a thorough cystoscopic evaluation patient's bladder including retroflexion, findings noted above.  We removed the cystoscope under direct vision to evaluate the urethra and prostate, findings noted above.  This concluded the procedure, the patient tolerated it well.     Assessment  Patient was instructed to call our office if he develops any signs of infection including increased frequency, urgency, dysuria, fever, or chills.  We discussed the results of the cystoscopy with the patient, all questions and concerns addressed.     Plan    1. Gross hematuria  - POCT Urinalysis  -  Basic Metabolic Panel; Future  - CT-ABDOMEN & PELVIS UROGRAM; Future    Will order urine cytology    Bruno Godwin M.D., F.A.C.S.  Urologic Oncology  Vice-Chair, Department of Surgery  [facility]      No follow-ups on file.           [1]   Social History  Socioeconomic History    Marital status:    Tobacco Use    Smoking status: Every Day     Current packs/day: 0.00     Average packs/day: 0.5 packs/day for 28.0 years (14.0 ttl pk-yrs)     Types: Cigarettes     Start date: 1986     Last attempt to quit: 2014     Years since quittin.0    Smokeless tobacco: Never   Substance and Sexual Activity    Alcohol use: No    Drug use: No    Sexual activity: Yes     Comment: Work as    [2] No past surgical history on file.  [3]   Past Medical History:  Diagnosis Date    Abnormal glucose tolerance test     History of TIA (transient ischemic attack) 2013    Low HDL (under 40)    [4] No Known Allergies

## 2025-05-23 ENCOUNTER — HOSPITAL ENCOUNTER (OUTPATIENT)
Dept: LAB | Facility: MEDICAL CENTER | Age: 60
End: 2025-05-23
Attending: UROLOGY
Payer: COMMERCIAL

## 2025-05-23 DIAGNOSIS — R31.0 GROSS HEMATURIA: ICD-10-CM

## 2025-05-23 LAB
ANION GAP SERPL CALC-SCNC: 11 MMOL/L (ref 7–16)
BUN SERPL-MCNC: 12 MG/DL (ref 8–22)
CALCIUM SERPL-MCNC: 8.7 MG/DL (ref 8.5–10.5)
CHLORIDE SERPL-SCNC: 107 MMOL/L (ref 96–112)
CO2 SERPL-SCNC: 23 MMOL/L (ref 20–33)
CREAT SERPL-MCNC: 0.79 MG/DL (ref 0.5–1.4)
GFR SERPLBLD CREATININE-BSD FMLA CKD-EPI: 102 ML/MIN/1.73 M 2
GLUCOSE SERPL-MCNC: 88 MG/DL (ref 65–99)
POTASSIUM SERPL-SCNC: 3.9 MMOL/L (ref 3.6–5.5)
SODIUM SERPL-SCNC: 141 MMOL/L (ref 135–145)

## 2025-05-23 PROCEDURE — 88112 CYTOPATH CELL ENHANCE TECH: CPT | Performed by: PATHOLOGY

## 2025-05-23 PROCEDURE — 36415 COLL VENOUS BLD VENIPUNCTURE: CPT

## 2025-05-23 PROCEDURE — 88112 CYTOPATH CELL ENHANCE TECH: CPT | Mod: 26 | Performed by: PATHOLOGY

## 2025-05-23 PROCEDURE — 80048 BASIC METABOLIC PNL TOTAL CA: CPT

## 2025-05-27 LAB — CYTOLOGY REG CYTOL: NORMAL

## 2025-05-28 ENCOUNTER — RESULTS FOLLOW-UP (OUTPATIENT)
Dept: UROLOGY | Facility: MEDICAL CENTER | Age: 60
End: 2025-05-28

## 2025-07-19 ENCOUNTER — HOSPITAL ENCOUNTER (OUTPATIENT)
Dept: LAB | Facility: MEDICAL CENTER | Age: 60
End: 2025-07-19
Attending: UROLOGY
Payer: COMMERCIAL

## 2025-07-19 DIAGNOSIS — R97.20 ELEVATED PSA: ICD-10-CM

## 2025-07-19 LAB — PSA SERPL DL<=0.01 NG/ML-MCNC: 5.07 NG/ML (ref 0–4)

## 2025-07-19 PROCEDURE — 88112 CYTOPATH CELL ENHANCE TECH: CPT | Mod: 26 | Performed by: PATHOLOGY

## 2025-07-19 PROCEDURE — 88112 CYTOPATH CELL ENHANCE TECH: CPT | Performed by: PATHOLOGY

## 2025-07-19 PROCEDURE — 36415 COLL VENOUS BLD VENIPUNCTURE: CPT

## 2025-07-19 PROCEDURE — 84153 ASSAY OF PSA TOTAL: CPT

## 2025-07-21 DIAGNOSIS — R97.20 ELEVATED PSA: Primary | ICD-10-CM

## 2025-07-21 LAB — CYTOLOGY REG CYTOL: NORMAL

## 2025-07-23 ENCOUNTER — HOSPITAL ENCOUNTER (OUTPATIENT)
Dept: RADIOLOGY | Facility: MEDICAL CENTER | Age: 60
End: 2025-07-23
Attending: UROLOGY
Payer: COMMERCIAL

## 2025-07-23 DIAGNOSIS — R31.0 GROSS HEMATURIA: ICD-10-CM

## 2025-07-23 PROCEDURE — 74178 CT ABD&PLV WO CNTR FLWD CNTR: CPT

## 2025-07-23 PROCEDURE — 700117 HCHG RX CONTRAST REV CODE 255: Performed by: UROLOGY

## 2025-07-23 RX ADMIN — IOHEXOL 100 ML: 350 INJECTION, SOLUTION INTRAVENOUS at 15:19

## 2025-07-29 ENCOUNTER — OFFICE VISIT (OUTPATIENT)
Dept: UROLOGY | Facility: MEDICAL CENTER | Age: 60
End: 2025-07-29
Payer: COMMERCIAL

## 2025-07-29 DIAGNOSIS — R97.20 ELEVATED PSA: Primary | ICD-10-CM

## 2025-07-29 DIAGNOSIS — N40.1 BENIGN PROSTATIC HYPERPLASIA WITH URINARY OBSTRUCTION: ICD-10-CM

## 2025-07-29 DIAGNOSIS — N13.8 BENIGN PROSTATIC HYPERPLASIA WITH URINARY OBSTRUCTION: ICD-10-CM

## 2025-07-29 RX ORDER — TAMSULOSIN HYDROCHLORIDE 0.4 MG/1
0.4 CAPSULE ORAL
Qty: 30 CAPSULE | Refills: 12 | Status: SHIPPED | OUTPATIENT
Start: 2025-07-29

## 2025-07-29 NOTE — PROGRESS NOTES
Subjective  Alek Carvalho is a 59 y.o. male who presents today for follow up elevated PSA and gross hematuria.  His gross hematuria workup was negative.  He had elevated PSA of 11 at the time of infection.  On recheck it was 5.  I ordered another one for 2 months from now.        Family History   Problem Relation Age of Onset    Cancer Father         lung?    No Known Problems Mother        Social History[1]    Past Surgical History[2]    Past Medical History[3]    No current outpatient medications on file.       Allergies[4]    Objective  There were no vitals taken for this visit.  Physical Exam      Labs:     POC UA  Lab Results   Component Value Date/Time    POCCOLOR yellow 05/20/2025 01:06 AM    POCAPPEAR clear 05/20/2025 01:06 AM    POCLEUKEST trace 05/20/2025 01:06 AM    POCNITRITE neg 05/20/2025 01:06 AM    POCUROBILIGE 0.2 05/20/2025 01:06 AM    POCPROTEIN trace 05/20/2025 01:06 AM    POCURPH 6.0 05/20/2025 01:06 AM    POCBLOOD trace 05/20/2025 01:06 AM    POCSPGRV 1.020 05/20/2025 01:06 AM    POCKETONES neg 05/20/2025 01:06 AM    POCBILIRUBIN neg 05/20/2025 01:06 AM    POCGLUCUA neg 05/20/2025 01:06 AM        CMP  Lab Results   Component Value Date/Time    SODIUM 141 05/23/2025 1414    POTASSIUM 3.9 05/23/2025 1414    CHLORIDE 107 05/23/2025 1414    CO2 23 05/23/2025 1414    ANION 11.0 05/23/2025 1414    GLUCOSE 88 05/23/2025 1414    BUN 12 05/23/2025 1414    CREATININE 0.79 05/23/2025 1414    GFRCKD 102 05/23/2025 1414    CALCIUM 8.7 05/23/2025 1414    CORRCALC 9.2 04/17/2025 1810    ASTSGOT 29 04/17/2025 1810    ALTSGPT 28 04/17/2025 1810    ALKPHOSPHAT 164 (H) 04/17/2025 1810    TBILIRUBIN 0.4 04/17/2025 1810    ALBUMIN 3.8 04/17/2025 1810    TOTPROTEIN 8.0 04/17/2025 1810    GLOBULIN 4.2 (H) 04/17/2025 1810    AGRATIO 0.9 04/17/2025 1810       BMP  Lab Results   Component Value Date/Time    SODIUM 141 05/23/2025 1414    POTASSIUM 3.9 05/23/2025 1414    CHLORIDE 107 05/23/2025 1414    CO2 23  2025 1414    GLUCOSE 88 2025 1414    BUN 12 2025 1414    CREATININE 0.79 2025 1414    CALCIUM 8.7 2025 1414       CBC  Lab Results   Component Value Date/Time    WBC 13.0 (H) 2025 1810    RBC 4.04 (L) 2025 1810    HEMOGLOBIN 12.8 (L) 2025 1810    HEMATOCRIT 38.6 (L) 2025 1810    MCV 95.5 2025 1810    MCH 31.7 2025 1810    MCHC 33.2 2025 1810    RDW 46.7 2025 1810    MPV 8.8 (L) 2025 1810    LYMPHOCYTES 15.80 (L) 2025 1810    LYMPHS 2.06 2025 1810    MONOCYTES 7.10 2025 1810    MONOS 0.92 (H) 2025 1810    EOSINOPHILS 0.20 2025 1810    EOS 0.02 2025 1810    BASOPHILS 0.20 2025 1810    BASO 0.03 2025 1810    NRBC 0.00 2025 1810       A1C  Lab Results   Component Value Date/Time    HBA1C 6.2 (H) 2025 1708    AVGLUC 131 2025 1708       Imaging:   None        Assessment & Plan    Elevated PSA.  This was at the time of infection.  The repeat was 5.  I ordered another 1 to 2 months from now. Started tamsulosin and discussed benefits and side effects.     Bruno Godwin M.D., F.A.C.S.  Urologic Oncology  Vice-Chair, Department of Surgery  [facility]         [1]   Social History  Socioeconomic History    Marital status:    Tobacco Use    Smoking status: Every Day     Current packs/day: 0.00     Average packs/day: 0.5 packs/day for 28.0 years (14.0 ttl pk-yrs)     Types: Cigarettes     Start date: 1986     Last attempt to quit: 2014     Years since quittin.2    Smokeless tobacco: Never   Substance and Sexual Activity    Alcohol use: No    Drug use: No    Sexual activity: Yes     Comment: Work as    [2] No past surgical history on file.  [3]   Past Medical History:  Diagnosis Date    Abnormal glucose tolerance test     History of TIA (transient ischemic attack) 2013    Low HDL (under 40)    [4] No Known Allergies

## 2025-08-26 ENCOUNTER — OFFICE VISIT (OUTPATIENT)
Dept: MEDICAL GROUP | Facility: MEDICAL CENTER | Age: 60
End: 2025-08-26
Payer: COMMERCIAL

## 2025-08-26 VITALS
TEMPERATURE: 97.8 F | WEIGHT: 153.22 LBS | OXYGEN SATURATION: 97 % | BODY MASS INDEX: 26.16 KG/M2 | DIASTOLIC BLOOD PRESSURE: 66 MMHG | HEART RATE: 61 BPM | HEIGHT: 64 IN | SYSTOLIC BLOOD PRESSURE: 110 MMHG

## 2025-08-26 DIAGNOSIS — E78.2 MIXED HYPERLIPIDEMIA: ICD-10-CM

## 2025-08-26 DIAGNOSIS — R73.03 PREDIABETES: ICD-10-CM

## 2025-08-26 DIAGNOSIS — E55.9 VITAMIN D DEFICIENCY: ICD-10-CM

## 2025-08-26 DIAGNOSIS — R91.8 GROUND GLASS OPACITY PRESENT ON IMAGING OF LUNG: Primary | ICD-10-CM

## 2025-08-26 PROCEDURE — 99214 OFFICE O/P EST MOD 30 MIN: CPT | Performed by: FAMILY MEDICINE

## 2025-08-26 PROCEDURE — 3078F DIAST BP <80 MM HG: CPT | Performed by: FAMILY MEDICINE

## 2025-08-26 PROCEDURE — 3074F SYST BP LT 130 MM HG: CPT | Performed by: FAMILY MEDICINE

## 2025-08-26 ASSESSMENT — ENCOUNTER SYMPTOMS
PALPITATIONS: 0
CHILLS: 0
FEVER: 0

## 2025-08-26 ASSESSMENT — FIBROSIS 4 INDEX: FIB4 SCORE: 0.69

## 2025-08-30 ENCOUNTER — HOSPITAL ENCOUNTER (OUTPATIENT)
Dept: LAB | Facility: MEDICAL CENTER | Age: 60
End: 2025-08-30
Attending: FAMILY MEDICINE
Payer: COMMERCIAL

## 2025-08-30 DIAGNOSIS — R97.20 ELEVATED PSA: ICD-10-CM

## 2025-08-30 DIAGNOSIS — E55.9 VITAMIN D DEFICIENCY: ICD-10-CM

## 2025-08-30 DIAGNOSIS — E78.2 MIXED HYPERLIPIDEMIA: ICD-10-CM

## 2025-08-30 DIAGNOSIS — R73.03 PREDIABETES: ICD-10-CM

## 2025-08-30 LAB
25(OH)D3 SERPL-MCNC: 44 NG/ML (ref 30–100)
ALBUMIN SERPL BCP-MCNC: 4.1 G/DL (ref 3.2–4.9)
ALBUMIN/GLOB SERPL: 1.3 G/DL
ALP SERPL-CCNC: 88 U/L (ref 30–99)
ALT SERPL-CCNC: 13 U/L (ref 2–50)
ANION GAP SERPL CALC-SCNC: 11 MMOL/L (ref 7–16)
AST SERPL-CCNC: 20 U/L (ref 12–45)
BILIRUB SERPL-MCNC: 0.3 MG/DL (ref 0.1–1.5)
BUN SERPL-MCNC: 16 MG/DL (ref 8–22)
CALCIUM ALBUM COR SERPL-MCNC: 8.7 MG/DL (ref 8.5–10.5)
CALCIUM SERPL-MCNC: 8.8 MG/DL (ref 8.5–10.5)
CHLORIDE SERPL-SCNC: 106 MMOL/L (ref 96–112)
CHOLEST SERPL-MCNC: 190 MG/DL (ref 100–199)
CO2 SERPL-SCNC: 24 MMOL/L (ref 20–33)
CREAT SERPL-MCNC: 1.03 MG/DL (ref 0.5–1.4)
EST. AVERAGE GLUCOSE BLD GHB EST-MCNC: 126 MG/DL
FASTING STATUS PATIENT QL REPORTED: NORMAL
GFR SERPLBLD CREATININE-BSD FMLA CKD-EPI: 83 ML/MIN/1.73 M 2
GLOBULIN SER CALC-MCNC: 3.1 G/DL (ref 1.9–3.5)
GLUCOSE SERPL-MCNC: 103 MG/DL (ref 65–99)
HBA1C MFR BLD: 6 % (ref 4–5.6)
HDLC SERPL-MCNC: 31 MG/DL
LDLC SERPL CALC-MCNC: 130 MG/DL
POTASSIUM SERPL-SCNC: 3.9 MMOL/L (ref 3.6–5.5)
PROT SERPL-MCNC: 7.2 G/DL (ref 6–8.2)
PSA SERPL DL<=0.01 NG/ML-MCNC: 4.76 NG/ML (ref 0–4)
SODIUM SERPL-SCNC: 141 MMOL/L (ref 135–145)
T4 FREE SERPL-MCNC: 1.26 NG/DL (ref 0.93–1.7)
TRIGL SERPL-MCNC: 146 MG/DL (ref 0–149)
TSH SERPL-ACNC: 1.83 UIU/ML (ref 0.38–5.33)

## 2025-08-30 PROCEDURE — 80053 COMPREHEN METABOLIC PANEL: CPT

## 2025-08-30 PROCEDURE — 82306 VITAMIN D 25 HYDROXY: CPT

## 2025-08-30 PROCEDURE — 36415 COLL VENOUS BLD VENIPUNCTURE: CPT

## 2025-08-30 PROCEDURE — 84153 ASSAY OF PSA TOTAL: CPT

## 2025-08-30 PROCEDURE — 84443 ASSAY THYROID STIM HORMONE: CPT

## 2025-08-30 PROCEDURE — 83036 HEMOGLOBIN GLYCOSYLATED A1C: CPT

## 2025-08-30 PROCEDURE — 84439 ASSAY OF FREE THYROXINE: CPT

## 2025-08-30 PROCEDURE — 80061 LIPID PANEL: CPT
